# Patient Record
Sex: FEMALE | Race: BLACK OR AFRICAN AMERICAN | NOT HISPANIC OR LATINO | URBAN - METROPOLITAN AREA
[De-identification: names, ages, dates, MRNs, and addresses within clinical notes are randomized per-mention and may not be internally consistent; named-entity substitution may affect disease eponyms.]

---

## 2019-05-15 ENCOUNTER — EMERGENCY (EMERGENCY)
Facility: HOSPITAL | Age: 69
LOS: 1 days | Discharge: HOME | End: 2019-05-15
Attending: EMERGENCY MEDICINE | Admitting: EMERGENCY MEDICINE
Payer: MEDICARE

## 2019-05-15 VITALS
OXYGEN SATURATION: 100 % | TEMPERATURE: 98 F | DIASTOLIC BLOOD PRESSURE: 77 MMHG | HEART RATE: 78 BPM | RESPIRATION RATE: 18 BRPM | SYSTOLIC BLOOD PRESSURE: 156 MMHG

## 2019-05-15 VITALS
RESPIRATION RATE: 18 BRPM | DIASTOLIC BLOOD PRESSURE: 94 MMHG | TEMPERATURE: 99 F | HEART RATE: 88 BPM | SYSTOLIC BLOOD PRESSURE: 165 MMHG | OXYGEN SATURATION: 100 %

## 2019-05-15 PROBLEM — Z00.00 ENCOUNTER FOR PREVENTIVE HEALTH EXAMINATION: Status: ACTIVE | Noted: 2019-05-15

## 2019-05-15 LAB
ALBUMIN SERPL ELPH-MCNC: 4.1 G/DL — SIGNIFICANT CHANGE UP (ref 3.3–5)
ALP SERPL-CCNC: 53 U/L — SIGNIFICANT CHANGE UP (ref 40–120)
ALT FLD-CCNC: 37 U/L — HIGH (ref 4–33)
ANION GAP SERPL CALC-SCNC: 13 MMO/L — SIGNIFICANT CHANGE UP (ref 7–14)
AST SERPL-CCNC: 31 U/L — SIGNIFICANT CHANGE UP (ref 4–32)
BASOPHILS # BLD AUTO: 0.04 K/UL — SIGNIFICANT CHANGE UP (ref 0–0.2)
BASOPHILS NFR BLD AUTO: 0.8 % — SIGNIFICANT CHANGE UP (ref 0–2)
BILIRUB SERPL-MCNC: < 0.2 MG/DL — LOW (ref 0.2–1.2)
BUN SERPL-MCNC: 8 MG/DL — SIGNIFICANT CHANGE UP (ref 7–23)
CALCIUM SERPL-MCNC: 9.3 MG/DL — SIGNIFICANT CHANGE UP (ref 8.4–10.5)
CHLORIDE SERPL-SCNC: 101 MMOL/L — SIGNIFICANT CHANGE UP (ref 98–107)
CO2 SERPL-SCNC: 22 MMOL/L — SIGNIFICANT CHANGE UP (ref 22–31)
CREAT SERPL-MCNC: 0.66 MG/DL — SIGNIFICANT CHANGE UP (ref 0.5–1.3)
EOSINOPHIL # BLD AUTO: 0.08 K/UL — SIGNIFICANT CHANGE UP (ref 0–0.5)
EOSINOPHIL NFR BLD AUTO: 1.6 % — SIGNIFICANT CHANGE UP (ref 0–6)
GLUCOSE SERPL-MCNC: 83 MG/DL — SIGNIFICANT CHANGE UP (ref 70–99)
HCT VFR BLD CALC: 36.3 % — SIGNIFICANT CHANGE UP (ref 34.5–45)
HGB BLD-MCNC: 12 G/DL — SIGNIFICANT CHANGE UP (ref 11.5–15.5)
IMM GRANULOCYTES NFR BLD AUTO: 0.2 % — SIGNIFICANT CHANGE UP (ref 0–1.5)
LYMPHOCYTES # BLD AUTO: 2.47 K/UL — SIGNIFICANT CHANGE UP (ref 1–3.3)
LYMPHOCYTES # BLD AUTO: 48 % — HIGH (ref 13–44)
MCHC RBC-ENTMCNC: 32.9 PG — SIGNIFICANT CHANGE UP (ref 27–34)
MCHC RBC-ENTMCNC: 33.1 % — SIGNIFICANT CHANGE UP (ref 32–36)
MCV RBC AUTO: 99.5 FL — SIGNIFICANT CHANGE UP (ref 80–100)
MONOCYTES # BLD AUTO: 0.56 K/UL — SIGNIFICANT CHANGE UP (ref 0–0.9)
MONOCYTES NFR BLD AUTO: 10.9 % — SIGNIFICANT CHANGE UP (ref 2–14)
NEUTROPHILS # BLD AUTO: 1.99 K/UL — SIGNIFICANT CHANGE UP (ref 1.8–7.4)
NEUTROPHILS NFR BLD AUTO: 38.5 % — LOW (ref 43–77)
NRBC # FLD: 0 K/UL — SIGNIFICANT CHANGE UP (ref 0–0)
PLATELET # BLD AUTO: 314 K/UL — SIGNIFICANT CHANGE UP (ref 150–400)
PMV BLD: 8.7 FL — SIGNIFICANT CHANGE UP (ref 7–13)
POTASSIUM SERPL-MCNC: 4.4 MMOL/L — SIGNIFICANT CHANGE UP (ref 3.5–5.3)
POTASSIUM SERPL-SCNC: 4.4 MMOL/L — SIGNIFICANT CHANGE UP (ref 3.5–5.3)
PROT SERPL-MCNC: 7.1 G/DL — SIGNIFICANT CHANGE UP (ref 6–8.3)
RBC # BLD: 3.65 M/UL — LOW (ref 3.8–5.2)
RBC # FLD: 14.4 % — SIGNIFICANT CHANGE UP (ref 10.3–14.5)
SODIUM SERPL-SCNC: 136 MMOL/L — SIGNIFICANT CHANGE UP (ref 135–145)
WBC # BLD: 5.15 K/UL — SIGNIFICANT CHANGE UP (ref 3.8–10.5)
WBC # FLD AUTO: 5.15 K/UL — SIGNIFICANT CHANGE UP (ref 3.8–10.5)

## 2019-05-15 PROCEDURE — 99284 EMERGENCY DEPT VISIT MOD MDM: CPT

## 2019-05-15 PROCEDURE — 73620 X-RAY EXAM OF FOOT: CPT | Mod: 26,LT

## 2019-05-15 RX ORDER — ACETAMINOPHEN 500 MG
650 TABLET ORAL ONCE
Refills: 0 | Status: COMPLETED | OUTPATIENT
Start: 2019-05-15 | End: 2019-05-15

## 2019-05-15 RX ADMIN — Medication 650 MILLIGRAM(S): at 22:56

## 2019-05-15 RX ADMIN — Medication 100 MILLIGRAM(S): at 22:56

## 2019-05-15 NOTE — ED PROVIDER NOTE - MDM ORDERS SUBMITTED SELECTION
What Is The Reason For Today's Visit?: Full Body Skin Examination
What Is The Reason For Today's Visit? (Being Monitored For X): surveillance against the recurrence of atypical nevi
Labs/Medications/Imaging Studies

## 2019-05-15 NOTE — ED PROVIDER NOTE - NSFOLLOWUPINSTRUCTIONS_ED_ALL_ED_FT
Follow up with Podiatry Dr. Morgan in 1 week. Call [phone] to make an appointment.  Soak foot in Epsom salts, apply bacitracin daily.     Take Clindamycin as prescribed.  Take Tylenol as needed for pain.  Keep elevated.    Return to ER for any new or worsening symptoms, fever, chills, swelling, or any other concerns.

## 2019-05-15 NOTE — ED PROVIDER NOTE - CLINICAL SUMMARY MEDICAL DECISION MAKING FREE TEXT BOX
69 y/o female with pmhx of DM on insulin presents to ED c/o left 1st great toe pain x 2 weeks. found with paronychia and ingrown toe nail. plan- abx, paronychia drainage by podiatry, labs, xray, dispo home

## 2019-05-15 NOTE — ED ADULT TRIAGE NOTE - CHIEF COMPLAINT QUOTE
Pt st" For 2 weeks having problem with left great toe.....saw a MD at [facility] in [place] around 2 hours ago told to go to ER for IV antibiotics."

## 2019-05-15 NOTE — ED PROVIDER NOTE - PROGRESS NOTE DETAILS
Coty: pt seen and evaluated by podiatry, paronychia drained, no need for admission will d.c home with abx pending labs. will dc home, cleared by podiatry

## 2019-05-15 NOTE — ED PROVIDER NOTE - OBJECTIVE STATEMENT
69 y/o female with pmhx of DM on insulin presents to ED c/o left 1st great toe pain x 2 weeks. States started as ingrown, gets the frequently and toe is swollen and tender. Evaluated by [facility] 67 y/o female with pmhx of DM on insulin presents to ED c/o left 1st great toe pain x 2 weeks. States started as ingrown, gets the frequently and toe is swollen and tender. Evaluated by [facility] and sent to ER for concern for bone infection and may need IV abx. No fever, chills, cp, sob, abd pain, n/v, weakness, numbness, tingling, ulcers, streaking, calf pain.

## 2019-05-15 NOTE — ED ADULT NURSE NOTE - CHIEF COMPLAINT QUOTE
Pt st" For 2 weeks having problem with left great toe.....saw a MD at [facility] in [place] around 2 hours ago told to go to ER for IV antibiotics."

## 2019-05-15 NOTE — ED PROVIDER NOTE - ATTENDING CONTRIBUTION TO CARE
Coty: 69yo female with a h/o HTN and DM sent in by [facility] for infected left great toe. Pt endorses 2 weeks of progressively worsening left great toe pain, swelling and erythema. She went to [facility] today and was sent to the ED for further evaluation. NO associated fevers or chills. NKDA. Exam: GENERAL: well appearing, NAD, HEENT: MMM, CARDIO: +S1/S2, no murmurs, rubs or gallops, LUNGS: CTA B/L, no wheezing, rales or rhonchi, ABD: soft, nontender, BSx4 quadrants, no guarding or rigidity. EXT: left great toe paronychia with TTP and erythema, no bony TTP, FROM, NVI distally NEURO: AxOx3, SKIN: toe exam as documented above A/P- 69 yo female with toe paronychia. Low suspicion for osteo or deep space infection. will obtain labs, xray and consult podiatry.

## 2019-05-15 NOTE — ED PROVIDER NOTE - SKIN WOUND TYPE
+left first big toe left sided paronychia and ingrown toe nail. no streaking. no crepitus. no calf tenderness, dp pusle 2+. capillary refill <2 seconds.

## 2019-05-15 NOTE — ED PROVIDER NOTE - SHIFT CHANGE DETAILS
I have signed over this patient to the above attending physician. Pertinent history, physical exam findings and workup thus far in the ED have been discussed. The pending tests and plan, including ;abs, XR and podiatry drainage were signed over.  All questions from the above attending physician have been answered.

## 2019-05-16 NOTE — CONSULT NOTE ADULT - SUBJECTIVE AND OBJECTIVE BOX
Podiatry pager #: [phone] ([place])/ [phone] ([facility])    Patient is a 68y old  Female who presents with a chief complaint of R hallux paronychia.    HPI: 69 y/o female with pmhx of DM on insulin presents to ED c/o left 1st great toe pain x 2 weeks. States started as ingrown, gets the frequently and toe is swollen and tender. Evaluated by [facility] care MD and sent to ER for concern for bone infection and may need IV abx. No fever, chills, cp, sob, abd pain, n/v, weakness, numbness, tingling, ulcers, streaking, calf pain.      PAST MEDICAL & SURGICAL HISTORY:  Hypertension, unspecified type  Diabetes mellitus  No significant past surgical history      MEDICATIONS  (STANDING):    MEDICATIONS  (PRN):      Allergies    No Known Allergies    Intolerances        VITALS:    Vital Signs Last 24 Hrs  T(C): 36.7 (15 May 2019 23:08), Max: 37.4 (15 May 2019 22:02)  T(F): 98 (15 May 2019 23:08), Max: 99.4 (15 May 2019 22:02)  HR: 78 (15 May 2019 23:08) (78 - 88)  BP: 156/77 (15 May 2019 23:08) (156/77 - 165/94)  BP(mean): --  RR: 18 (15 May 2019 23:08) (18 - 18)  SpO2: 100% (15 May 2019 23:08) (100% - 100%)    LABS:                          12.0   5.15  )-----------( 314      ( 15 May 2019 23:11 )             36.3       05-15    136  |  101  |  8   ----------------------------<  83  4.4   |  22  |  0.66    Ca    9.3      15 May 2019 23:11    TPro  7.1  /  Alb  4.1  /  TBili  < 0.2<L>  /  DBili  x   /  AST  31  /  ALT  37<H>  /  AlkPhos  53  05-15      CAPILLARY BLOOD GLUCOSE              LOWER EXTREMITY PHYSICAL EXAM:    Vascular: DP/PT 2/4 B/L, CFT <3 seconds B/L, Temperature gradient warm to cool B/L, +2 pitting edema on dorsum b/l  Neuro: Epicritic sensation intact to the level of toes B/L  Musculoskeletal/Ortho: no gross deformities  Skin: R hallux paronychia at lateral nail border w/ discoloration on dorsal aspect and distal tip of R hallux. Distal tip discoloration is very tender to touch, no fluctuance. No open wounds, no acute signs of infection.    RADIOLOGY & ADDITIONAL STUDIES:  < from: Xray Foot AP + Lateral, Left (05.15.19 @ 23:16) >    ******PRELIMINARY REPORT******    ******PRELIMINARY REPORT******            EXAM:  RAD FOOT 2 VIEWS LEFT        PROCEDURE DATE:  May 15 2019     ******PRELIMINARY REPORT******    ******PRELIMINARY REPORT******            INTERPRETATION:  Soft tissue swelling of the dorsum of the foot.    No subcutaneous gas.    No radiographic evidence of osteomyelitis.            ******PRELIMINARY REPORT******    ******PRELIMINARY REPORT******          KEYA AYALA M.D., RADIOLOGY RESIDENT          < end of copied text >
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
## 2019-09-02 ENCOUNTER — INPATIENT (INPATIENT)
Facility: HOSPITAL | Age: 69
LOS: 3 days | Discharge: PSYCHIATRIC OR SUBSTANCE USE FACILITY | DRG: 918 | End: 2019-09-06
Attending: SURGERY | Admitting: SURGERY
Payer: COMMERCIAL

## 2019-09-02 PROCEDURE — 99291 CRITICAL CARE FIRST HOUR: CPT

## 2019-09-02 PROCEDURE — 72170 X-RAY EXAM OF PELVIS: CPT | Mod: 26

## 2019-09-02 NOTE — ED PROVIDER NOTE - CRITICAL CARE PROVIDED
additional history taking/interpretation of diagnostic studies/direct patient care (not related to procedure)/consultation with other physicians/consult w/ pt's family directly relating to pts condition/documentation

## 2019-09-02 NOTE — ED PROVIDER NOTE - PHYSICAL EXAMINATION
Gen: diaphoretic, GCS 15  Head: NCAT  HEENT: pupils dilated, minimally reactive pupils, MMM, normal conjunctiva, anicteric, neck supple  Lung: CTAB, no adventitious sounds  CV: RRR, no murmurs, rubs or gallops  Abd: soft, NTND, no rebound or guarding, no CVAT  MSK: 2cm laceration to L AC, no active bleeding  Neuro: No focal neurologic deficits. CN II-XII grossly intact. 5/5 strength and normal sensation in all extremities.  Skin: Warm and dry, no evidence of rash  Psych: normal mood and affect
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
## 2019-09-03 VITALS
WEIGHT: 164.69 LBS | RESPIRATION RATE: 19 BRPM | HEART RATE: 74 BPM | DIASTOLIC BLOOD PRESSURE: 69 MMHG | OXYGEN SATURATION: 100 % | SYSTOLIC BLOOD PRESSURE: 133 MMHG | TEMPERATURE: 98 F | HEIGHT: 65 IN

## 2019-09-03 DIAGNOSIS — T39.1X2A POISONING BY 4-AMINOPHENOL DERIVATIVES, INTENTIONAL SELF-HARM, INITIAL ENCOUNTER: ICD-10-CM

## 2019-09-03 DIAGNOSIS — F33.2 MAJOR DEPRESSIVE DISORDER, RECURRENT SEVERE WITHOUT PSYCHOTIC FEATURES: ICD-10-CM

## 2019-09-03 DIAGNOSIS — T14.8XXA OTHER INJURY OF UNSPECIFIED BODY REGION, INITIAL ENCOUNTER: ICD-10-CM

## 2019-09-03 LAB
ALBUMIN SERPL ELPH-MCNC: 3.2 G/DL — LOW (ref 3.3–5)
ALBUMIN SERPL ELPH-MCNC: 3.6 G/DL — SIGNIFICANT CHANGE UP (ref 3.3–5)
ALBUMIN SERPL ELPH-MCNC: 3.9 G/DL — SIGNIFICANT CHANGE UP (ref 3.3–5)
ALP SERPL-CCNC: 36 U/L — LOW (ref 40–120)
ALP SERPL-CCNC: 38 U/L — LOW (ref 40–120)
ALP SERPL-CCNC: 48 U/L — SIGNIFICANT CHANGE UP (ref 40–120)
ALT FLD-CCNC: 19 U/L — SIGNIFICANT CHANGE UP (ref 10–45)
ALT FLD-CCNC: 20 U/L — SIGNIFICANT CHANGE UP (ref 10–45)
ALT FLD-CCNC: 24 U/L — SIGNIFICANT CHANGE UP (ref 10–45)
ANION GAP SERPL CALC-SCNC: 14 MMOL/L — SIGNIFICANT CHANGE UP (ref 5–17)
ANION GAP SERPL CALC-SCNC: 16 MMOL/L — SIGNIFICANT CHANGE UP (ref 5–17)
APAP SERPL-MCNC: 145 UG/ML — HIGH (ref 10–30)
APAP SERPL-MCNC: 31 UG/ML — HIGH (ref 10–30)
APAP SERPL-MCNC: <15 UG/ML — SIGNIFICANT CHANGE UP (ref 10–30)
APTT BLD: 22.1 SEC — LOW (ref 27.5–36.3)
APTT BLD: 32.1 SEC — SIGNIFICANT CHANGE UP (ref 27.5–36.3)
AST SERPL-CCNC: 14 U/L — SIGNIFICANT CHANGE UP (ref 10–40)
AST SERPL-CCNC: 15 U/L — SIGNIFICANT CHANGE UP (ref 10–40)
AST SERPL-CCNC: 23 U/L — SIGNIFICANT CHANGE UP (ref 10–40)
BASOPHILS # BLD AUTO: 0.1 K/UL — SIGNIFICANT CHANGE UP (ref 0–0.2)
BILIRUB DIRECT SERPL-MCNC: <0.1 MG/DL — SIGNIFICANT CHANGE UP (ref 0–0.2)
BILIRUB DIRECT SERPL-MCNC: <0.1 MG/DL — SIGNIFICANT CHANGE UP (ref 0–0.2)
BILIRUB INDIRECT FLD-MCNC: >0.1 MG/DL — LOW (ref 0.2–1)
BILIRUB INDIRECT FLD-MCNC: >0.1 MG/DL — LOW (ref 0.2–1)
BILIRUB SERPL-MCNC: 0.2 MG/DL — SIGNIFICANT CHANGE UP (ref 0.2–1.2)
BLD GP AB SCN SERPL QL: NEGATIVE — SIGNIFICANT CHANGE UP
BUN SERPL-MCNC: 12 MG/DL — SIGNIFICANT CHANGE UP (ref 7–23)
BUN SERPL-MCNC: 13 MG/DL — SIGNIFICANT CHANGE UP (ref 7–23)
BUN SERPL-MCNC: 15 MG/DL — SIGNIFICANT CHANGE UP (ref 7–23)
CALCIUM SERPL-MCNC: 7.9 MG/DL — LOW (ref 8.4–10.5)
CALCIUM SERPL-MCNC: 8.5 MG/DL — SIGNIFICANT CHANGE UP (ref 8.4–10.5)
CALCIUM SERPL-MCNC: 8.6 MG/DL — SIGNIFICANT CHANGE UP (ref 8.4–10.5)
CHLORIDE SERPL-SCNC: 93 MMOL/L — LOW (ref 96–108)
CHLORIDE SERPL-SCNC: 96 MMOL/L — SIGNIFICANT CHANGE UP (ref 96–108)
CHLORIDE SERPL-SCNC: 97 MMOL/L — SIGNIFICANT CHANGE UP (ref 96–108)
CO2 SERPL-SCNC: 11 MMOL/L — LOW (ref 22–31)
CO2 SERPL-SCNC: 18 MMOL/L — LOW (ref 22–31)
CO2 SERPL-SCNC: 21 MMOL/L — LOW (ref 22–31)
CREAT SERPL-MCNC: 0.49 MG/DL — LOW (ref 0.5–1.3)
CREAT SERPL-MCNC: 0.58 MG/DL — SIGNIFICANT CHANGE UP (ref 0.5–1.3)
CREAT SERPL-MCNC: 0.94 MG/DL — SIGNIFICANT CHANGE UP (ref 0.5–1.3)
EOSINOPHIL # BLD AUTO: 0 K/UL — SIGNIFICANT CHANGE UP (ref 0–0.5)
ETHANOL SERPL-MCNC: 63 MG/DL — HIGH (ref 0–10)
GAS PNL BLDA: SIGNIFICANT CHANGE UP
GLUCOSE BLDC GLUCOMTR-MCNC: 148 MG/DL — HIGH (ref 70–99)
GLUCOSE BLDC GLUCOMTR-MCNC: 185 MG/DL — HIGH (ref 70–99)
GLUCOSE BLDC GLUCOMTR-MCNC: 224 MG/DL — HIGH (ref 70–99)
GLUCOSE SERPL-MCNC: 172 MG/DL — HIGH (ref 70–99)
GLUCOSE SERPL-MCNC: 211 MG/DL — HIGH (ref 70–99)
GLUCOSE SERPL-MCNC: 319 MG/DL — HIGH (ref 70–99)
HCT VFR BLD CALC: 30.6 % — LOW (ref 34.5–45)
HCT VFR BLD CALC: 34.7 % — SIGNIFICANT CHANGE UP (ref 34.5–45)
HCV AB S/CO SERPL IA: 0.09 S/CO — SIGNIFICANT CHANGE UP (ref 0–0.99)
HCV AB SERPL-IMP: SIGNIFICANT CHANGE UP
HGB BLD-MCNC: 10.5 G/DL — LOW (ref 11.5–15.5)
HGB BLD-MCNC: 11.6 G/DL — SIGNIFICANT CHANGE UP (ref 11.5–15.5)
INR BLD: 1.03 RATIO — SIGNIFICANT CHANGE UP (ref 0.88–1.16)
INR BLD: 1.1 RATIO — SIGNIFICANT CHANGE UP (ref 0.88–1.16)
LIDOCAIN IGE QN: 81 U/L — HIGH (ref 7–60)
LYMPHOCYTES # BLD AUTO: 4.1 K/UL — HIGH (ref 1–3.3)
LYMPHOCYTES # BLD AUTO: 62 % — HIGH (ref 13–44)
MAGNESIUM SERPL-MCNC: 1.7 MG/DL — SIGNIFICANT CHANGE UP (ref 1.6–2.6)
MAGNESIUM SERPL-MCNC: 1.9 MG/DL — SIGNIFICANT CHANGE UP (ref 1.6–2.6)
MAGNESIUM SERPL-MCNC: 2.2 MG/DL — SIGNIFICANT CHANGE UP (ref 1.6–2.6)
MCHC RBC-ENTMCNC: 33.3 GM/DL — SIGNIFICANT CHANGE UP (ref 32–36)
MCHC RBC-ENTMCNC: 34.1 PG — HIGH (ref 27–34)
MCHC RBC-ENTMCNC: 34.4 GM/DL — SIGNIFICANT CHANGE UP (ref 32–36)
MCHC RBC-ENTMCNC: 34.4 PG — HIGH (ref 27–34)
MCV RBC AUTO: 100 FL — SIGNIFICANT CHANGE UP (ref 80–100)
MCV RBC AUTO: 102 FL — HIGH (ref 80–100)
MONOCYTES # BLD AUTO: 0.4 K/UL — SIGNIFICANT CHANGE UP (ref 0–0.9)
MONOCYTES NFR BLD AUTO: 6 % — SIGNIFICANT CHANGE UP (ref 2–14)
NEUTROPHILS # BLD AUTO: 1.4 K/UL — LOW (ref 1.8–7.4)
NEUTROPHILS NFR BLD AUTO: 19 % — LOW (ref 43–77)
PCP SPEC-MCNC: SIGNIFICANT CHANGE UP
PHOSPHATE SERPL-MCNC: 1.9 MG/DL — LOW (ref 2.5–4.5)
PHOSPHATE SERPL-MCNC: 3.6 MG/DL — SIGNIFICANT CHANGE UP (ref 2.5–4.5)
PLAT MORPH BLD: NORMAL — SIGNIFICANT CHANGE UP
PLATELET # BLD AUTO: 218 K/UL — SIGNIFICANT CHANGE UP (ref 150–400)
PLATELET # BLD AUTO: 276 K/UL — SIGNIFICANT CHANGE UP (ref 150–400)
POTASSIUM SERPL-MCNC: 3.7 MMOL/L — SIGNIFICANT CHANGE UP (ref 3.5–5.3)
POTASSIUM SERPL-MCNC: 3.7 MMOL/L — SIGNIFICANT CHANGE UP (ref 3.5–5.3)
POTASSIUM SERPL-MCNC: 4.2 MMOL/L — SIGNIFICANT CHANGE UP (ref 3.5–5.3)
POTASSIUM SERPL-SCNC: 3.7 MMOL/L — SIGNIFICANT CHANGE UP (ref 3.5–5.3)
POTASSIUM SERPL-SCNC: 3.7 MMOL/L — SIGNIFICANT CHANGE UP (ref 3.5–5.3)
POTASSIUM SERPL-SCNC: 4.2 MMOL/L — SIGNIFICANT CHANGE UP (ref 3.5–5.3)
PROT SERPL-MCNC: 5.6 G/DL — LOW (ref 6–8.3)
PROT SERPL-MCNC: 6 G/DL — SIGNIFICANT CHANGE UP (ref 6–8.3)
PROT SERPL-MCNC: 6.9 G/DL — SIGNIFICANT CHANGE UP (ref 6–8.3)
PROTHROM AB SERPL-ACNC: 11.7 SEC — SIGNIFICANT CHANGE UP (ref 10–12.9)
PROTHROM AB SERPL-ACNC: 12.7 SEC — SIGNIFICANT CHANGE UP (ref 10–12.9)
RBC # BLD: 3.06 M/UL — LOW (ref 3.8–5.2)
RBC # BLD: 3.39 M/UL — LOW (ref 3.8–5.2)
RBC # FLD: 12.5 % — SIGNIFICANT CHANGE UP (ref 10.3–14.5)
RBC # FLD: 12.6 % — SIGNIFICANT CHANGE UP (ref 10.3–14.5)
RBC BLD AUTO: SIGNIFICANT CHANGE UP
RH IG SCN BLD-IMP: NEGATIVE — SIGNIFICANT CHANGE UP
SALICYLATES SERPL-MCNC: <2 MG/DL — LOW (ref 15–30)
SODIUM SERPL-SCNC: 130 MMOL/L — LOW (ref 135–145)
SODIUM SERPL-SCNC: 131 MMOL/L — LOW (ref 135–145)
SODIUM SERPL-SCNC: 131 MMOL/L — LOW (ref 135–145)
TROPONIN T, HIGH SENSITIVITY RESULT: 12 NG/L — SIGNIFICANT CHANGE UP (ref 0–51)
TSH SERPL-MCNC: 3.27 UIU/ML — SIGNIFICANT CHANGE UP (ref 0.27–4.2)
VARIANT LYMPHS # BLD: 13 % — HIGH (ref 0–6)
WBC # BLD: 5.9 K/UL — SIGNIFICANT CHANGE UP (ref 3.8–10.5)
WBC # BLD: 7.4 K/UL — SIGNIFICANT CHANGE UP (ref 3.8–10.5)
WBC # FLD AUTO: 5.9 K/UL — SIGNIFICANT CHANGE UP (ref 3.8–10.5)
WBC # FLD AUTO: 7.4 K/UL — SIGNIFICANT CHANGE UP (ref 3.8–10.5)

## 2019-09-03 PROCEDURE — 72125 CT NECK SPINE W/O DYE: CPT | Mod: 26

## 2019-09-03 PROCEDURE — 71260 CT THORAX DX C+: CPT | Mod: 26

## 2019-09-03 PROCEDURE — 71045 X-RAY EXAM CHEST 1 VIEW: CPT | Mod: 26

## 2019-09-03 PROCEDURE — 70450 CT HEAD/BRAIN W/O DYE: CPT | Mod: 26

## 2019-09-03 PROCEDURE — 99223 1ST HOSP IP/OBS HIGH 75: CPT | Mod: GC

## 2019-09-03 PROCEDURE — 99291 CRITICAL CARE FIRST HOUR: CPT

## 2019-09-03 PROCEDURE — 74177 CT ABD & PELVIS W/CONTRAST: CPT | Mod: 26

## 2019-09-03 RX ORDER — AMLODIPINE BESYLATE 2.5 MG/1
10 TABLET ORAL DAILY
Refills: 0 | Status: DISCONTINUED | OUTPATIENT
Start: 2019-09-03 | End: 2019-09-03

## 2019-09-03 RX ORDER — BRIMONIDINE TARTRATE 2 MG/MG
1 SOLUTION/ DROPS OPHTHALMIC EVERY 12 HOURS
Refills: 0 | Status: DISCONTINUED | OUTPATIENT
Start: 2019-09-03 | End: 2019-09-06

## 2019-09-03 RX ORDER — KETOROLAC TROMETHAMINE 0.5 %
1 DROPS OPHTHALMIC (EYE) DAILY
Refills: 0 | Status: DISCONTINUED | OUTPATIENT
Start: 2019-09-03 | End: 2019-09-06

## 2019-09-03 RX ORDER — HYDRALAZINE HCL 50 MG
10 TABLET ORAL ONCE
Refills: 0 | Status: DISCONTINUED | OUTPATIENT
Start: 2019-09-03 | End: 2019-09-03

## 2019-09-03 RX ORDER — HYDROCHLOROTHIAZIDE 25 MG
25 TABLET ORAL EVERY 24 HOURS
Refills: 0 | Status: DISCONTINUED | OUTPATIENT
Start: 2019-09-03 | End: 2019-09-05

## 2019-09-03 RX ORDER — HYDRALAZINE HCL 50 MG
100 TABLET ORAL
Refills: 0 | Status: DISCONTINUED | OUTPATIENT
Start: 2019-09-03 | End: 2019-09-03

## 2019-09-03 RX ORDER — ZOLPIDEM TARTRATE 10 MG/1
5 TABLET ORAL AT BEDTIME
Refills: 0 | Status: DISCONTINUED | OUTPATIENT
Start: 2019-09-03 | End: 2019-09-03

## 2019-09-03 RX ORDER — CHLORHEXIDINE GLUCONATE 213 G/1000ML
1 SOLUTION TOPICAL
Refills: 0 | Status: DISCONTINUED | OUTPATIENT
Start: 2019-09-03 | End: 2019-09-04

## 2019-09-03 RX ORDER — HYDRALAZINE HCL 50 MG
10 TABLET ORAL ONCE
Refills: 0 | Status: COMPLETED | OUTPATIENT
Start: 2019-09-03 | End: 2019-09-03

## 2019-09-03 RX ORDER — ACETYLCYSTEINE 200 MG/ML
7 VIAL (ML) MISCELLANEOUS ONCE
Refills: 0 | Status: COMPLETED | OUTPATIENT
Start: 2019-09-03 | End: 2019-09-03

## 2019-09-03 RX ORDER — INSULIN LISPRO 100/ML
VIAL (ML) SUBCUTANEOUS
Refills: 0 | Status: DISCONTINUED | OUTPATIENT
Start: 2019-09-03 | End: 2019-09-06

## 2019-09-03 RX ORDER — LISINOPRIL 2.5 MG/1
20 TABLET ORAL DAILY
Refills: 0 | Status: DISCONTINUED | OUTPATIENT
Start: 2019-09-03 | End: 2019-09-03

## 2019-09-03 RX ORDER — TIMOLOL 0.5 %
1 DROPS OPHTHALMIC (EYE)
Refills: 0 | Status: DISCONTINUED | OUTPATIENT
Start: 2019-09-03 | End: 2019-09-03

## 2019-09-03 RX ORDER — SODIUM CHLORIDE 9 MG/ML
1000 INJECTION, SOLUTION INTRAVENOUS
Refills: 0 | Status: DISCONTINUED | OUTPATIENT
Start: 2019-09-03 | End: 2019-09-03

## 2019-09-03 RX ORDER — VALACYCLOVIR 500 MG/1
500 TABLET, FILM COATED ORAL DAILY
Refills: 0 | Status: DISCONTINUED | OUTPATIENT
Start: 2019-09-03 | End: 2019-09-06

## 2019-09-03 RX ORDER — LABETALOL HCL 100 MG
10 TABLET ORAL ONCE
Refills: 0 | Status: COMPLETED | OUTPATIENT
Start: 2019-09-03 | End: 2019-09-03

## 2019-09-03 RX ORDER — INSULIN LISPRO 100/ML
VIAL (ML) SUBCUTANEOUS AT BEDTIME
Refills: 0 | Status: DISCONTINUED | OUTPATIENT
Start: 2019-09-03 | End: 2019-09-06

## 2019-09-03 RX ORDER — LANOLIN ALCOHOL/MO/W.PET/CERES
3 CREAM (GRAM) TOPICAL AT BEDTIME
Refills: 0 | Status: DISCONTINUED | OUTPATIENT
Start: 2019-09-03 | End: 2019-09-06

## 2019-09-03 RX ORDER — HYDRALAZINE HCL 50 MG
25 TABLET ORAL EVERY 8 HOURS
Refills: 0 | Status: DISCONTINUED | OUTPATIENT
Start: 2019-09-03 | End: 2019-09-03

## 2019-09-03 RX ORDER — HYDRALAZINE HCL 50 MG
100 TABLET ORAL
Refills: 0 | Status: DISCONTINUED | OUTPATIENT
Start: 2019-09-03 | End: 2019-09-05

## 2019-09-03 RX ORDER — ACETYLCYSTEINE 200 MG/ML
3.7 VIAL (ML) MISCELLANEOUS ONCE
Refills: 0 | Status: COMPLETED | OUTPATIENT
Start: 2019-09-03 | End: 2019-09-03

## 2019-09-03 RX ORDER — BRIMONIDINE TARTRATE 2 MG/MG
1 SOLUTION/ DROPS OPHTHALMIC
Refills: 0 | Status: DISCONTINUED | OUTPATIENT
Start: 2019-09-03 | End: 2019-09-03

## 2019-09-03 RX ORDER — POTASSIUM CHLORIDE 20 MEQ
40 PACKET (EA) ORAL ONCE
Refills: 0 | Status: COMPLETED | OUTPATIENT
Start: 2019-09-03 | End: 2019-09-03

## 2019-09-03 RX ORDER — TETANUS TOXOID, REDUCED DIPHTHERIA TOXOID AND ACELLULAR PERTUSSIS VACCINE, ADSORBED 5; 2.5; 8; 8; 2.5 [IU]/.5ML; [IU]/.5ML; UG/.5ML; UG/.5ML; UG/.5ML
0.5 SUSPENSION INTRAMUSCULAR ONCE
Refills: 0 | Status: DISCONTINUED | OUTPATIENT
Start: 2019-09-03 | End: 2019-09-06

## 2019-09-03 RX ORDER — SODIUM,POTASSIUM PHOSPHATES 278-250MG
1 POWDER IN PACKET (EA) ORAL
Refills: 0 | Status: DISCONTINUED | OUTPATIENT
Start: 2019-09-03 | End: 2019-09-06

## 2019-09-03 RX ORDER — TIMOLOL 0.5 %
1 DROPS OPHTHALMIC (EYE) EVERY 12 HOURS
Refills: 0 | Status: DISCONTINUED | OUTPATIENT
Start: 2019-09-03 | End: 2019-09-06

## 2019-09-03 RX ORDER — ENOXAPARIN SODIUM 100 MG/ML
40 INJECTION SUBCUTANEOUS EVERY 24 HOURS
Refills: 0 | Status: DISCONTINUED | OUTPATIENT
Start: 2019-09-03 | End: 2019-09-06

## 2019-09-03 RX ORDER — LISINOPRIL 2.5 MG/1
20 TABLET ORAL DAILY
Refills: 0 | Status: DISCONTINUED | OUTPATIENT
Start: 2019-09-03 | End: 2019-09-06

## 2019-09-03 RX ORDER — CALCIUM GLUCONATE 100 MG/ML
2 VIAL (ML) INTRAVENOUS ONCE
Refills: 0 | Status: COMPLETED | OUTPATIENT
Start: 2019-09-03 | End: 2019-09-03

## 2019-09-03 RX ORDER — DOXEPIN HCL 100 MG
50 CAPSULE ORAL
Refills: 0 | Status: DISCONTINUED | OUTPATIENT
Start: 2019-09-03 | End: 2019-09-06

## 2019-09-03 RX ORDER — HYDROCHLOROTHIAZIDE 25 MG
25 TABLET ORAL DAILY
Refills: 0 | Status: DISCONTINUED | OUTPATIENT
Start: 2019-09-03 | End: 2019-09-03

## 2019-09-03 RX ORDER — MAGNESIUM SULFATE 500 MG/ML
2 VIAL (ML) INJECTION ONCE
Refills: 0 | Status: COMPLETED | OUTPATIENT
Start: 2019-09-03 | End: 2019-09-03

## 2019-09-03 RX ORDER — POTASSIUM PHOSPHATE, MONOBASIC POTASSIUM PHOSPHATE, DIBASIC 236; 224 MG/ML; MG/ML
15 INJECTION, SOLUTION INTRAVENOUS ONCE
Refills: 0 | Status: COMPLETED | OUTPATIENT
Start: 2019-09-03 | End: 2019-09-03

## 2019-09-03 RX ORDER — ACETYLCYSTEINE 200 MG/ML
11 VIAL (ML) MISCELLANEOUS ONCE
Refills: 0 | Status: COMPLETED | OUTPATIENT
Start: 2019-09-03 | End: 2019-09-03

## 2019-09-03 RX ADMIN — ENOXAPARIN SODIUM 40 MILLIGRAM(S): 100 INJECTION SUBCUTANEOUS at 12:29

## 2019-09-03 RX ADMIN — LISINOPRIL 20 MILLIGRAM(S): 2.5 TABLET ORAL at 08:40

## 2019-09-03 RX ADMIN — Medication 1 DROP(S): at 17:33

## 2019-09-03 RX ADMIN — Medication 129.63 GRAM(S): at 03:00

## 2019-09-03 RX ADMIN — Medication 50 MILLIGRAM(S): at 22:47

## 2019-09-03 RX ADMIN — Medication 4: at 17:34

## 2019-09-03 RX ADMIN — Medication 10 MILLIGRAM(S): at 15:35

## 2019-09-03 RX ADMIN — SODIUM CHLORIDE 100 MILLILITER(S): 9 INJECTION, SOLUTION INTRAVENOUS at 04:06

## 2019-09-03 RX ADMIN — Medication 1 APPLICATION(S): at 17:20

## 2019-09-03 RX ADMIN — VALACYCLOVIR 500 MILLIGRAM(S): 500 TABLET, FILM COATED ORAL at 17:17

## 2019-09-03 RX ADMIN — Medication 40 MILLIEQUIVALENT(S): at 06:42

## 2019-09-03 RX ADMIN — Medication 3 MILLIGRAM(S): at 22:47

## 2019-09-03 RX ADMIN — LISINOPRIL 20 MILLIGRAM(S): 2.5 TABLET ORAL at 16:34

## 2019-09-03 RX ADMIN — SODIUM CHLORIDE 100 MILLILITER(S): 9 INJECTION, SOLUTION INTRAVENOUS at 08:42

## 2019-09-03 RX ADMIN — Medication 25 MILLIGRAM(S): at 16:34

## 2019-09-03 RX ADMIN — Medication 200 GRAM(S): at 22:33

## 2019-09-03 RX ADMIN — Medication 305 GRAM(S): at 02:00

## 2019-09-03 RX ADMIN — Medication 10 MILLIGRAM(S): at 07:00

## 2019-09-03 RX ADMIN — BRIMONIDINE TARTRATE 1 DROP(S): 2 SOLUTION/ DROPS OPHTHALMIC at 17:19

## 2019-09-03 RX ADMIN — POTASSIUM PHOSPHATE, MONOBASIC POTASSIUM PHOSPHATE, DIBASIC 62.5 MILLIMOLE(S): 236; 224 INJECTION, SOLUTION INTRAVENOUS at 23:24

## 2019-09-03 RX ADMIN — Medication 64.69 GRAM(S): at 08:41

## 2019-09-03 RX ADMIN — Medication 100 MILLIGRAM(S): at 13:09

## 2019-09-03 RX ADMIN — Medication 1 DROP(S): at 17:18

## 2019-09-03 RX ADMIN — Medication 2: at 12:56

## 2019-09-03 RX ADMIN — Medication 50 GRAM(S): at 06:41

## 2019-09-03 NOTE — CONSULT NOTE ADULT - PROBLEM SELECTOR RECOMMENDATION 9
-21 hour protocol of NAC: 200 mg/kg over 1 hour loading dose, then 100 mg/kg over 4 hours, then 150 mg/kg over 16 hours.   -repeat lft's, pH, lactate, phos, coags, APAP level, prior to termination of protocol to determine if further NAC on modified protocol is needed.   -supportive care  -rest of care per primary team   -thank you for the consult -21 hour protocol of NAC: 150 mg/kg over 1 hour loading dose, then 50 mg/kg over 4 hours, then 100 mg/kg over 16 hours.   -repeat lft's, pH, lactate, phos, coags, APAP level, prior to termination of protocol to determine if further NAC on modified protocol is needed.   -supportive care  -rest of care per primary team   -thank you for the consult

## 2019-09-03 NOTE — BEHAVIORAL HEALTH ASSESSMENT NOTE - NSBHCHARTREVIEWIMAGING_PSY_A_CORE FT
CT Head and Cervical Spine:  Impression:     1. No evidence of acute intracranial trauma.  2. No acute displaced cervical spine fracture.  3. Left thyroid nodules. Nonurgent ultrasound is recommended for further   evaluation.    CT Chest & A/P:  IMPRESSION:       1. No evidence of acute trauma within the chest, abdomen and pelvis.  2. Mild CBD dilatation up to 8 mm. Recommend nonurgent MRI for further   evaluation.  3. Mild dilatation of the main pulmonary artery, which can be seen with   pulmonary arterial hypertension.  4. Small bilateral adrenal myelolipomas and small right upper pole renal   angiomyolipoma.
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
## 2019-09-03 NOTE — BEHAVIORAL HEALTH ASSESSMENT NOTE - DESCRIPTION (FIRST USE, LAST USE, QUANTITY, FREQUENCY, DURATION)
Currently still using, two drinks of wine per night last two weeks. Previously drank vodka and rum during marriage
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
## 2019-09-03 NOTE — BEHAVIORAL HEALTH ASSESSMENT NOTE - NSBHCHARTREVIEWVS_PSY_A_CORE FT
ICU Vital Signs Last 24 Hrs  T(C): 36.2 (03 Sep 2019 11:00), Max: 37 (03 Sep 2019 07:00)  T(F): 97.2 (03 Sep 2019 11:00), Max: 98.6 (03 Sep 2019 07:00)  HR: 90 (03 Sep 2019 11:00) (71 - 96)  BP: 180/89 (03 Sep 2019 08:33) (117/68 - 180/89)  BP(mean): 127 (03 Sep 2019 08:33) (88 - 127)  ABP: 143/55 (03 Sep 2019 11:00) (137/55 - 198/84)  ABP(mean): 80 (03 Sep 2019 11:00) (78 - 127)  RR: 13 (03 Sep 2019 11:00) (8 - 27)  SpO2: 100% (03 Sep 2019 11:00) (99% - 100%)

## 2019-09-03 NOTE — H&P ADULT - NSHPLABSRESULTS_GEN_ALL_CORE
CBC Full  -  ( 02 Sep 2019 23:55 )  WBC Count : 5.9 K/uL  RBC Count : 3.39 M/uL  Hemoglobin : 11.6 g/dL  Hematocrit : 34.7 %  Platelet Count - Automated : 276 K/uL  Mean Cell Volume : 102.0 fl  Mean Cell Hemoglobin : 34.1 pg  Mean Cell Hemoglobin Concentration : 33.3 gm/dL  Auto Neutrophil # : x  Auto Lymphocyte # : x  Auto Monocyte # : x  Auto Eosinophil # : x  Auto Basophil # : x  Auto Neutrophil % : x  Auto Lymphocyte % : x  Auto Monocyte % : x  Auto Eosinophil % : x  Auto Basophil % : x            CAPILLARY BLOOD GLUCOSE      POCT Blood Glucose.: 288 mg/dL (02 Sep 2019 23:35)
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
## 2019-09-03 NOTE — BEHAVIORAL HEALTH ASSESSMENT NOTE - DIFFERENTIAL
MDD vs PTSD, most likely decompensated MDD given patient's social situation, being off of her home medication, described symptoms, SA
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
## 2019-09-03 NOTE — BEHAVIORAL HEALTH ASSESSMENT NOTE - NSBHCHARTREVIEWLAB_PSY_A_CORE FT
CBC Full  -  ( 03 Sep 2019 04:20 )  WBC Count : 7.4 K/uL  RBC Count : 3.06 M/uL  Hemoglobin : 10.5 g/dL  Hematocrit : 30.6 %  Platelet Count - Automated : 218 K/uL  Mean Cell Volume : 100.0 fl  Mean Cell Hemoglobin : 34.4 pg  Mean Cell Hemoglobin Concentration : 34.4 gm/dL  Auto Neutrophil # : x  Auto Lymphocyte # : x  Auto Monocyte # : x  Auto Eosinophil # : x  Auto Basophil # : x  Auto Neutrophil % : x  Auto Lymphocyte % : x  Auto Monocyte % : x  Auto Eosinophil % : x  Auto Basophil % : x    09-03    131<L>  |  97  |  13  ----------------------------<  172<H>  3.7   |  18<L>  |  0.49<L>    Ca    7.9<L>      03 Sep 2019 04:20  Phos  3.6     09-03  Mg     1.7     09-03    TPro  5.6<L>  /  Alb  3.2<L>  /  TBili  0.2  /  DBili  <0.1  /  AST  15  /  ALT  19  /  AlkPhos  36<L>  09-03    LIVER FUNCTIONS - ( 03 Sep 2019 04:20 )  Alb: 3.2 g/dL / Pro: 5.6 g/dL / ALK PHOS: 36 U/L / ALT: 19 U/L / AST: 15 U/L / GGT: x

## 2019-09-03 NOTE — BEHAVIORAL HEALTH ASSESSMENT NOTE - NSBHCONSULTMEDS_PSY_A_CORE FT
1. Recommend re-starting patient's home Doxepin 75mg qHS    2. Melatonin 3mg qHS    3. Daily LFT's: Will require psychiatric admission following medical clearance, would like 48-72 hours to see stable liver enzymes if possible 1. Recommend re-starting patient's home Doxepin 50mg qHS    2. Melatonin 3mg qHS    3. Daily LFT's: Will require psychiatric admission following medical clearance, would like 48-72 hours to see stable liver enzymes if possible    4. c/w 1:1 CO

## 2019-09-03 NOTE — BEHAVIORAL HEALTH ASSESSMENT NOTE - NSBHCHARTREVIEWINVESTIGATE_PSY_A_CORE FT
Ventricular Rate 83 BPM    Atrial Rate 83 BPM    P-R Interval 168 ms    QRS Duration 90 ms    Q-T Interval 382 ms    QTC Calculation(Bezet) 448 ms    P Axis 51 degrees    R Axis 45 degrees    T Axis 61 degrees    Diagnosis Line NORMAL SINUS RHYTHM  NORMAL ECG    Confirmed by ATTENDING, ED ([id number]),  SHERIDAN LEVY ([id number]) on 9/3/2019 5:16:56 AM
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
## 2019-09-03 NOTE — CONSULT NOTE ADULT - ASSESSMENT
Given the patients unknown time of ingestion and elevated APAP level to 145, NAC is indicated to prevent hepatoxicity.    Regarding the patients, hypotension, it is possible that she has a coingestion with hydralazine, but this seems to be resolving after fluids. Given the patients unknown time of ingestion and elevated APAP level to 145, NAC is indicated to prevent hepatoxicity.    Regarding the patients, hypotension, it is possible that she has a coingestion with hydralazine, but this seems to be resolving after fluids.      Given the fact the patient does not have a clear time of ingestion, NAC treatment was advised. APAP is metabolized to NAPQI by CYP2e1. NAPQI is a free radical product which then leads to increased cellular ROS and creates protein adducts interfering with normal cellular enzymatic processes. As small amounts of free radicals accumulate, glutathione is reduced to GSSH, detoxifying the free radical. However when large amounts of a free radical product accumulate such as NAPQI during overdose, glutathiones is consumed. Once glutathione is completely consumed, hepatocytes begin to under necrosis/apoptosis.     The antidote NAC prevents this process by indirectly replenishing intracellular glutathione. In APAP overdose patients treated with NAC before 8 hours after ingestion, the prognosis is excellent. Given the patients unknown time of ingestion and elevated APAP level to 145, NAC is indicated to prevent hepatoxicity.    Regarding the patients, hypotension, it is possible that she has a coingestion with hydralazine, but this seems to be resolving after fluids.      Given the fact the patient did not initially have a clear time of ingestion, NAC treatment was advised. APAP is metabolized to NAPQI by CYP2e1. NAPQI is a free radical product which then leads to increased cellular ROS and creates protein adducts interfering with normal cellular enzymatic processes. As small amounts of free radicals accumulate, glutathione is reduced to GSSH, detoxifying the free radical. However when large amounts of a free radical product accumulate such as NAPQI during overdose, glutathiones is consumed. Once glutathione is completely consumed, hepatocytes begin to under necrosis/apoptosis.     The antidote NAC prevents this process by indirectly replenishing intracellular glutathione. In APAP overdose patients treated with NAC before 8 hours after ingestion, the prognosis is excellent.

## 2019-09-04 LAB
ALBUMIN SERPL ELPH-MCNC: 3.2 G/DL — LOW (ref 3.3–5)
ALP SERPL-CCNC: 34 U/L — LOW (ref 40–120)
ALT FLD-CCNC: 16 U/L — SIGNIFICANT CHANGE UP (ref 10–45)
ANION GAP SERPL CALC-SCNC: 12 MMOL/L — SIGNIFICANT CHANGE UP (ref 5–17)
APAP SERPL-MCNC: <15 UG/ML — SIGNIFICANT CHANGE UP (ref 10–30)
AST SERPL-CCNC: 13 U/L — SIGNIFICANT CHANGE UP (ref 10–40)
BILIRUB DIRECT SERPL-MCNC: <0.1 MG/DL — SIGNIFICANT CHANGE UP (ref 0–0.2)
BILIRUB INDIRECT FLD-MCNC: >0.1 MG/DL — LOW (ref 0.2–1)
BILIRUB SERPL-MCNC: 0.2 MG/DL — SIGNIFICANT CHANGE UP (ref 0.2–1.2)
BUN SERPL-MCNC: 8 MG/DL — SIGNIFICANT CHANGE UP (ref 7–23)
CALCIUM SERPL-MCNC: 8.5 MG/DL — SIGNIFICANT CHANGE UP (ref 8.4–10.5)
CHLORIDE SERPL-SCNC: 99 MMOL/L — SIGNIFICANT CHANGE UP (ref 96–108)
CO2 SERPL-SCNC: 21 MMOL/L — LOW (ref 22–31)
CREAT SERPL-MCNC: 0.51 MG/DL — SIGNIFICANT CHANGE UP (ref 0.5–1.3)
GLUCOSE BLDC GLUCOMTR-MCNC: 158 MG/DL — HIGH (ref 70–99)
GLUCOSE BLDC GLUCOMTR-MCNC: 168 MG/DL — HIGH (ref 70–99)
GLUCOSE BLDC GLUCOMTR-MCNC: 176 MG/DL — HIGH (ref 70–99)
GLUCOSE BLDC GLUCOMTR-MCNC: 194 MG/DL — HIGH (ref 70–99)
GLUCOSE SERPL-MCNC: 153 MG/DL — HIGH (ref 70–99)
HBA1C BLD-MCNC: 5.6 % — SIGNIFICANT CHANGE UP (ref 4–5.6)
HCT VFR BLD CALC: 25.8 % — LOW (ref 34.5–45)
HGB BLD-MCNC: 9.1 G/DL — LOW (ref 11.5–15.5)
MAGNESIUM SERPL-MCNC: 1.8 MG/DL — SIGNIFICANT CHANGE UP (ref 1.6–2.6)
MCHC RBC-ENTMCNC: 35 PG — HIGH (ref 27–34)
MCHC RBC-ENTMCNC: 35.4 GM/DL — SIGNIFICANT CHANGE UP (ref 32–36)
MCV RBC AUTO: 98.8 FL — SIGNIFICANT CHANGE UP (ref 80–100)
PHOSPHATE SERPL-MCNC: 3.9 MG/DL — SIGNIFICANT CHANGE UP (ref 2.5–4.5)
PLATELET # BLD AUTO: 158 K/UL — SIGNIFICANT CHANGE UP (ref 150–400)
POTASSIUM SERPL-MCNC: 4 MMOL/L — SIGNIFICANT CHANGE UP (ref 3.5–5.3)
POTASSIUM SERPL-SCNC: 4 MMOL/L — SIGNIFICANT CHANGE UP (ref 3.5–5.3)
PROT SERPL-MCNC: 5.5 G/DL — LOW (ref 6–8.3)
RBC # BLD: 2.61 M/UL — LOW (ref 3.8–5.2)
RBC # FLD: 13 % — SIGNIFICANT CHANGE UP (ref 10.3–14.5)
SODIUM SERPL-SCNC: 132 MMOL/L — LOW (ref 135–145)
WBC # BLD: 7.4 K/UL — SIGNIFICANT CHANGE UP (ref 3.8–10.5)
WBC # FLD AUTO: 7.4 K/UL — SIGNIFICANT CHANGE UP (ref 3.8–10.5)

## 2019-09-04 PROCEDURE — 99232 SBSQ HOSP IP/OBS MODERATE 35: CPT

## 2019-09-04 PROCEDURE — 99232 SBSQ HOSP IP/OBS MODERATE 35: CPT | Mod: GC

## 2019-09-04 RX ORDER — MAGNESIUM SULFATE 500 MG/ML
2 VIAL (ML) INJECTION ONCE
Refills: 0 | Status: COMPLETED | OUTPATIENT
Start: 2019-09-04 | End: 2019-09-04

## 2019-09-04 RX ADMIN — CHLORHEXIDINE GLUCONATE 1 APPLICATION(S): 213 SOLUTION TOPICAL at 06:11

## 2019-09-04 RX ADMIN — Medication 1 TABLET(S): at 11:33

## 2019-09-04 RX ADMIN — Medication 3 MILLIGRAM(S): at 22:32

## 2019-09-04 RX ADMIN — VALACYCLOVIR 500 MILLIGRAM(S): 500 TABLET, FILM COATED ORAL at 11:32

## 2019-09-04 RX ADMIN — Medication 1 APPLICATION(S): at 18:11

## 2019-09-04 RX ADMIN — Medication 1 DROP(S): at 11:32

## 2019-09-04 RX ADMIN — Medication 1 TABLET(S): at 09:54

## 2019-09-04 RX ADMIN — LISINOPRIL 20 MILLIGRAM(S): 2.5 TABLET ORAL at 06:08

## 2019-09-04 RX ADMIN — Medication 1 TABLET(S): at 18:20

## 2019-09-04 RX ADMIN — Medication 1 TABLET(S): at 22:32

## 2019-09-04 RX ADMIN — BRIMONIDINE TARTRATE 1 DROP(S): 2 SOLUTION/ DROPS OPHTHALMIC at 18:11

## 2019-09-04 RX ADMIN — Medication 1 APPLICATION(S): at 06:11

## 2019-09-04 RX ADMIN — Medication 50 MILLIGRAM(S): at 22:32

## 2019-09-04 RX ADMIN — Medication 50 GRAM(S): at 05:15

## 2019-09-04 RX ADMIN — Medication 2: at 11:31

## 2019-09-04 RX ADMIN — Medication 25 MILLIGRAM(S): at 18:10

## 2019-09-04 RX ADMIN — Medication 2: at 06:08

## 2019-09-04 RX ADMIN — Medication 100 MILLIGRAM(S): at 18:11

## 2019-09-04 RX ADMIN — Medication 100 MILLIGRAM(S): at 06:12

## 2019-09-04 RX ADMIN — ENOXAPARIN SODIUM 40 MILLIGRAM(S): 100 INJECTION SUBCUTANEOUS at 11:33

## 2019-09-04 RX ADMIN — Medication 2: at 18:13

## 2019-09-04 RX ADMIN — BRIMONIDINE TARTRATE 1 DROP(S): 2 SOLUTION/ DROPS OPHTHALMIC at 06:08

## 2019-09-04 RX ADMIN — Medication 1 DROP(S): at 06:13

## 2019-09-04 RX ADMIN — Medication 1 DROP(S): at 18:14

## 2019-09-04 NOTE — PROGRESS NOTE BEHAVIORAL HEALTH - NSBHCHARTREVIEWIMAGING_PSY_A_CORE FT
· Available Imaging Results: CT Head and Cervical Spine:  	Impression:     	1. No evidence of acute intracranial trauma.  	2. No acute displaced cervical spine fracture.  	3. Left thyroid nodules. Nonurgent ultrasound is recommended for further   	evaluation.    	CT Chest & A/P:  	IMPRESSION:       	1. No evidence of acute trauma within the chest, abdomen and pelvis.  	2. Mild CBD dilatation up to 8 mm. Recommend nonurgent MRI for further   	evaluation.  	3. Mild dilatation of the main pulmonary artery, which can be seen with   	pulmonary arterial hypertension.  	4. Small bilateral adrenal myelolipomas and small right upper pole renal   angiomyolipoma.
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
## 2019-09-04 NOTE — PROGRESS NOTE ADULT - SUBJECTIVE AND OBJECTIVE BOX
TRAUMA SURGERY DAILY PROGRESS NOTE:    Interval:  Psych meds restarted.    Subjective:  Patient seen and examined this am.     Vital Signs Last 24 Hrs  T(C): 36.7 (04 Sep 2019 07:00), Max: 37 (03 Sep 2019 19:00)  T(F): 98 (04 Sep 2019 07:00), Max: 98.6 (03 Sep 2019 19:00)  HR: 83 (04 Sep 2019 10:00) (73 - 105)  BP: 101/56 (04 Sep 2019 10:00) (91/52 - 161/82)  BP(mean): 74 (04 Sep 2019 10:00) (66 - 113)  RR: 29 (04 Sep 2019 10:00) (14 - 41)  SpO2: 99% (04 Sep 2019 10:00) (97% - 100%)    Exam:  Gen: NAD, resting in bed, alert and responding appropriately  Resp: Airway patent, non-labored respirations  Abd: Soft, ND, NTTP x 4 quadrants, no rebound or guarding. Incisions c/d/i  Ext: No edema, WWP LUE: Dressings cdi BCR  Neuro: AAOx3, no focal deficits    I&O's Detail    03 Sep 2019 07:01  -  04 Sep 2019 07:00  --------------------------------------------------------  IN:    IV PiggyBack: 975 mL    lactated ringers.: 400 mL    Oral Fluid: 1720 mL    Solution: 400 mL  Total IN: 3495 mL    OUT:    Indwelling Catheter - Urethral: 2000 mL    Voided: 2100 mL  Total OUT: 4100 mL    Total NET: -605 mL      04 Sep 2019 07:01  -  04 Sep 2019 11:14  --------------------------------------------------------  IN:    Oral Fluid: 610 mL  Total IN: 610 mL    OUT:  Total OUT: 0 mL    Total NET: 610 mL          Daily     Daily Weight in k.3 (04 Sep 2019 06:00)    MEDICATIONS  (STANDING):  brimonidine 0.2% Ophthalmic Solution 1 Drop(s) Both EYES every 12 hours  chlorhexidine 2% Cloths 1 Application(s) Topical <User Schedule>  diphtheria/tetanus/pertussis (acellular) Vaccine (ADAcel) 0.5 milliLiter(s) IntraMuscular once  doxepin 50 milliGRAM(s) Oral <User Schedule>  enoxaparin Injectable 40 milliGRAM(s) SubCutaneous every 24 hours  hydrALAZINE 100 milliGRAM(s) Oral two times a day  hydrochlorothiazide 25 milliGRAM(s) Oral every 24 hours  insulin lispro (HumaLOG) corrective regimen sliding scale   SubCutaneous at bedtime  insulin lispro (HumaLOG) corrective regimen sliding scale   SubCutaneous three times a day before meals  ketorolac 0.5% Ophthalmic Solution 1 Drop(s) Both EYES daily  lisinopril 20 milliGRAM(s) Oral daily  melatonin 3 milliGRAM(s) Oral at bedtime  potassium acid phosphate/sodium acid phosphate tablet (K-PHOS No. 2) 1 Tablet(s) Oral four times a day with meals  silver sulfADIAZINE 1% Cream 1 Application(s) Topical two times a day  timolol 0.5% Solution 1 Drop(s) Both EYES every 12 hours  valACYclovir 500 milliGRAM(s) Oral daily    MEDICATIONS  (PRN):      LABS:                        9.1    7.4   )-----------( 158      ( 04 Sep 2019 03:43 )             25.8     -    132<L>  |  99  |  8   ----------------------------<  153<H>  4.0   |  21<L>  |  0.51    Ca    8.5      04 Sep 2019 03:43  Phos  3.9     -  Mg     1.8         TPro  5.5<L>  /  Alb  3.2<L>  /  TBili  0.2  /  DBili  <0.1  /  AST  13  /  ALT  16  /  AlkPhos  34<L>      PT/INR - ( 03 Sep 2019 21:29 )   PT: 11.7 sec;   INR: 1.03 ratio         PTT - ( 03 Sep 2019 21:29 )  PTT:32.1 sec      KRISS Ruffin, PGY-1  [facility]  [id number] with any questions
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
## 2019-09-04 NOTE — PROVIDER CONTACT NOTE (OTHER) - ACTION/TREATMENT ORDERED:
No further treatment needed.  Case discussed with inpatient team.  Please page toxicology with a full phone number including area code for any questions at [phone].
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
## 2019-09-04 NOTE — PROGRESS NOTE ADULT - ATTENDING COMMENTS
Patient seen and examined and agree with above.   NAC drip stopped overnight because tylenol level < 15  Hemodynamically stable.   Will follow with toxicology  -Patient to go to acute psych inservice when medically cleared  Essential hypertension controlled with lisinopril and HCTZ   -goal  mmHg  Hyperglycemia- DM type 2- will continue ISS with goal BG < 180
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
## 2019-09-04 NOTE — PHYSICAL THERAPY INITIAL EVALUATION ADULT - ADDITIONAL COMMENTS
as per pt: PTA pt was living in a PH + Stairs approx 5 steps lives on first floor and was independent in all functional mobility and ADL's. no AD for gait. Pt owns a RW and a cane states she uses them when needed. Pt states she lives with [facility]
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
## 2019-09-04 NOTE — CHART NOTE - NSCHARTNOTEFT_GEN_A_CORE
TRAUMA SURGERY NOTE:    Interval:  Pt transferred from SICU to [facility].    Subjective:  Patient seen and examined.    Exam:  Gen: NAD, resting in bed, alert and responding appropriately  Resp: Airway patent, non-labored respirations  Abd: Soft, ND, NTTP x 4 quadrants, no rebound or guarding.   Ext: No edema, WWP LUE: Dressings cdi BCR  Neuro: AAOx3, no focal deficits    Vital Signs Last 24 Hrs  T(C): 36.6 (04 Sep 2019 12:06), Max: 37 (03 Sep 2019 19:00)  T(F): 97.9 (04 Sep 2019 12:06), Max: 98.6 (03 Sep 2019 19:00)  HR: 73 (04 Sep 2019 12:06) (73 - 105)  BP: 104/62 (04 Sep 2019 12:06) (91/52 - 161/82)  BP(mean): 78 (04 Sep 2019 11:00) (66 - 113)  RR: 18 (04 Sep 2019 12:06) (14 - 41)  SpO2: 98% (04 Sep 2019 12:06) (97% - 100%)    I&O's Detail    03 Sep 2019 07:01  -  04 Sep 2019 07:00  --------------------------------------------------------  IN:    IV PiggyBack: 975 mL    lactated ringers.: 400 mL    Oral Fluid: 1720 mL    Solution: 400 mL  Total IN: 3495 mL    OUT:    Indwelling Catheter - Urethral: 2000 mL    Voided: 2100 mL  Total OUT: 4100 mL    Total NET: -605 mL      04 Sep 2019 07:01  -  04 Sep 2019 12:42  --------------------------------------------------------  IN:    Oral Fluid: 970 mL  Total IN: 970 mL    OUT:  Total OUT: 0 mL    Total NET: 970 mL          Daily     Daily Weight in k.3 (04 Sep 2019 06:00)    MEDICATIONS  (STANDING):  brimonidine 0.2% Ophthalmic Solution 1 Drop(s) Both EYES every 12 hours  diphtheria/tetanus/pertussis (acellular) Vaccine (ADAcel) 0.5 milliLiter(s) IntraMuscular once  doxepin 50 milliGRAM(s) Oral <User Schedule>  enoxaparin Injectable 40 milliGRAM(s) SubCutaneous every 24 hours  hydrALAZINE 100 milliGRAM(s) Oral two times a day  hydrochlorothiazide 25 milliGRAM(s) Oral every 24 hours  insulin lispro (HumaLOG) corrective regimen sliding scale   SubCutaneous at bedtime  insulin lispro (HumaLOG) corrective regimen sliding scale   SubCutaneous three times a day before meals  ketorolac 0.5% Ophthalmic Solution 1 Drop(s) Both EYES daily  lisinopril 20 milliGRAM(s) Oral daily  melatonin 3 milliGRAM(s) Oral at bedtime  potassium acid phosphate/sodium acid phosphate tablet (K-PHOS No. 2) 1 Tablet(s) Oral four times a day with meals  silver sulfADIAZINE 1% Cream 1 Application(s) Topical two times a day  timolol 0.5% Solution 1 Drop(s) Both EYES every 12 hours  valACYclovir 500 milliGRAM(s) Oral daily    MEDICATIONS  (PRN):      LABS:                        9.1    7.4   )-----------( 158      ( 04 Sep 2019 03:43 )             25.8     -    132<L>  |  99  |  8   ----------------------------<  153<H>  4.0   |  21<L>  |  0.51    Ca    8.5      04 Sep 2019 03:43  Phos  3.9     -  Mg     1.8         TPro  5.5<L>  /  Alb  3.2<L>  /  TBili  0.2  /  DBili  <0.1  /  AST  13  /  ALT  16  /  AlkPhos  34<L>      PT/INR - ( 03 Sep 2019 21:29 )   PT: 11.7 sec;   INR: 1.03 ratio         PTT - ( 03 Sep 2019 21:29 )  PTT:32.1 sec      Plan:   - Follow LFTs  - Plan to dc to psych per tox plans  - Tertiary exam    KRISS Ruffin, PGY-1  ACS Trauma Team Surgery  [id number] with any questions

## 2019-09-04 NOTE — PROVIDER CONTACT NOTE (OTHER) - RECOMMENDATIONS
Patient does not need another dose of IV n-acetylcysteine and will not develop any hepatotoxicity from ingestion of acetaminophen.  Patient can cleared from any acetaminophen toxicity.

## 2019-09-04 NOTE — PROVIDER CONTACT NOTE (OTHER) - ASSESSMENT
Patient has received 21 hour IV NAC.  Repeat acetaminophen level undetectable and LFTs within normal limits.

## 2019-09-05 PROBLEM — E11.9 TYPE 2 DIABETES MELLITUS WITHOUT COMPLICATIONS: Chronic | Status: ACTIVE | Noted: 2019-05-15

## 2019-09-05 PROBLEM — I10 ESSENTIAL (PRIMARY) HYPERTENSION: Chronic | Status: ACTIVE | Noted: 2019-05-15

## 2019-09-05 LAB
ALBUMIN SERPL ELPH-MCNC: 4.1 G/DL — SIGNIFICANT CHANGE UP (ref 3.3–5)
ALP SERPL-CCNC: 46 U/L — SIGNIFICANT CHANGE UP (ref 40–120)
ALT FLD-CCNC: 32 U/L — SIGNIFICANT CHANGE UP (ref 10–45)
ANION GAP SERPL CALC-SCNC: 12 MMOL/L — SIGNIFICANT CHANGE UP (ref 5–17)
AST SERPL-CCNC: 31 U/L — SIGNIFICANT CHANGE UP (ref 10–40)
BILIRUB DIRECT SERPL-MCNC: <0.1 MG/DL — SIGNIFICANT CHANGE UP (ref 0–0.2)
BILIRUB INDIRECT FLD-MCNC: SIGNIFICANT CHANGE UP MG/DL (ref 0.2–1)
BILIRUB SERPL-MCNC: 0.3 MG/DL — SIGNIFICANT CHANGE UP (ref 0.2–1.2)
BUN SERPL-MCNC: 9 MG/DL — SIGNIFICANT CHANGE UP (ref 7–23)
CALCIUM SERPL-MCNC: 9.5 MG/DL — SIGNIFICANT CHANGE UP (ref 8.4–10.5)
CHLORIDE SERPL-SCNC: 97 MMOL/L — SIGNIFICANT CHANGE UP (ref 96–108)
CO2 SERPL-SCNC: 22 MMOL/L — SIGNIFICANT CHANGE UP (ref 22–31)
CREAT SERPL-MCNC: 0.6 MG/DL — SIGNIFICANT CHANGE UP (ref 0.5–1.3)
GLUCOSE BLDC GLUCOMTR-MCNC: 126 MG/DL — HIGH (ref 70–99)
GLUCOSE BLDC GLUCOMTR-MCNC: 163 MG/DL — HIGH (ref 70–99)
GLUCOSE BLDC GLUCOMTR-MCNC: 177 MG/DL — HIGH (ref 70–99)
GLUCOSE BLDC GLUCOMTR-MCNC: 228 MG/DL — HIGH (ref 70–99)
GLUCOSE SERPL-MCNC: 147 MG/DL — HIGH (ref 70–99)
HCT VFR BLD CALC: 28.9 % — LOW (ref 34.5–45)
HGB BLD-MCNC: 9.7 G/DL — LOW (ref 11.5–15.5)
MAGNESIUM SERPL-MCNC: 2 MG/DL — SIGNIFICANT CHANGE UP (ref 1.6–2.6)
MCHC RBC-ENTMCNC: 33 PG — SIGNIFICANT CHANGE UP (ref 27–34)
MCHC RBC-ENTMCNC: 33.6 GM/DL — SIGNIFICANT CHANGE UP (ref 32–36)
MCV RBC AUTO: 98.3 FL — SIGNIFICANT CHANGE UP (ref 80–100)
PHOSPHATE SERPL-MCNC: 4.6 MG/DL — HIGH (ref 2.5–4.5)
PLATELET # BLD AUTO: 214 K/UL — SIGNIFICANT CHANGE UP (ref 150–400)
POTASSIUM SERPL-MCNC: 4.2 MMOL/L — SIGNIFICANT CHANGE UP (ref 3.5–5.3)
POTASSIUM SERPL-SCNC: 4.2 MMOL/L — SIGNIFICANT CHANGE UP (ref 3.5–5.3)
PROT SERPL-MCNC: 6.9 G/DL — SIGNIFICANT CHANGE UP (ref 6–8.3)
RBC # BLD: 2.94 M/UL — LOW (ref 3.8–5.2)
RBC # FLD: 13.5 % — SIGNIFICANT CHANGE UP (ref 10.3–14.5)
SODIUM SERPL-SCNC: 131 MMOL/L — LOW (ref 135–145)
WBC # BLD: 6.03 K/UL — SIGNIFICANT CHANGE UP (ref 3.8–10.5)
WBC # FLD AUTO: 6.03 K/UL — SIGNIFICANT CHANGE UP (ref 3.8–10.5)

## 2019-09-05 PROCEDURE — 99232 SBSQ HOSP IP/OBS MODERATE 35: CPT

## 2019-09-05 RX ORDER — HYDRALAZINE HCL 50 MG
100 TABLET ORAL DAILY
Refills: 0 | Status: DISCONTINUED | OUTPATIENT
Start: 2019-09-05 | End: 2019-09-06

## 2019-09-05 RX ORDER — SODIUM CHLORIDE 9 MG/ML
1000 INJECTION INTRAMUSCULAR; INTRAVENOUS; SUBCUTANEOUS ONCE
Refills: 0 | Status: COMPLETED | OUTPATIENT
Start: 2019-09-05 | End: 2019-09-05

## 2019-09-05 RX ADMIN — Medication 1 TABLET(S): at 13:13

## 2019-09-05 RX ADMIN — Medication 1 TABLET(S): at 09:34

## 2019-09-05 RX ADMIN — LISINOPRIL 20 MILLIGRAM(S): 2.5 TABLET ORAL at 06:24

## 2019-09-05 RX ADMIN — Medication 1 DROP(S): at 18:16

## 2019-09-05 RX ADMIN — Medication 2: at 13:14

## 2019-09-05 RX ADMIN — ENOXAPARIN SODIUM 40 MILLIGRAM(S): 100 INJECTION SUBCUTANEOUS at 13:14

## 2019-09-05 RX ADMIN — Medication 1 APPLICATION(S): at 18:15

## 2019-09-05 RX ADMIN — Medication 2: at 09:31

## 2019-09-05 RX ADMIN — VALACYCLOVIR 500 MILLIGRAM(S): 500 TABLET, FILM COATED ORAL at 13:14

## 2019-09-05 RX ADMIN — Medication 3 MILLIGRAM(S): at 22:45

## 2019-09-05 RX ADMIN — Medication 100 MILLIGRAM(S): at 06:24

## 2019-09-05 RX ADMIN — BRIMONIDINE TARTRATE 1 DROP(S): 2 SOLUTION/ DROPS OPHTHALMIC at 06:25

## 2019-09-05 RX ADMIN — Medication 1 DROP(S): at 06:50

## 2019-09-05 RX ADMIN — BRIMONIDINE TARTRATE 1 DROP(S): 2 SOLUTION/ DROPS OPHTHALMIC at 18:15

## 2019-09-05 RX ADMIN — Medication 1 TABLET(S): at 22:45

## 2019-09-05 RX ADMIN — SODIUM CHLORIDE 2000 MILLILITER(S): 9 INJECTION INTRAMUSCULAR; INTRAVENOUS; SUBCUTANEOUS at 14:44

## 2019-09-05 RX ADMIN — Medication 1 TABLET(S): at 18:15

## 2019-09-05 RX ADMIN — Medication 1 APPLICATION(S): at 06:25

## 2019-09-05 RX ADMIN — Medication 50 MILLIGRAM(S): at 22:45

## 2019-09-05 NOTE — PROGRESS NOTE ADULT - SUBJECTIVE AND OBJECTIVE BOX
Trauma Surgery Progress Note    SUBJECTIVE: Pt seen and examined at bedside. No acute events overnight. Pain well controlled. Denies chest pain, SOB, N/V/D, fevers or chills.       MEDICATIONS  (STANDING):  brimonidine 0.2% Ophthalmic Solution 1 Drop(s) Both EYES every 12 hours  diphtheria/tetanus/pertussis (acellular) Vaccine (ADAcel) 0.5 milliLiter(s) IntraMuscular once  doxepin 50 milliGRAM(s) Oral <User Schedule>  enoxaparin Injectable 40 milliGRAM(s) SubCutaneous every 24 hours  hydrALAZINE 100 milliGRAM(s) Oral two times a day  hydrochlorothiazide 25 milliGRAM(s) Oral every 24 hours  insulin lispro (HumaLOG) corrective regimen sliding scale   SubCutaneous at bedtime  insulin lispro (HumaLOG) corrective regimen sliding scale   SubCutaneous three times a day before meals  ketorolac 0.5% Ophthalmic Solution 1 Drop(s) Both EYES daily  lisinopril 20 milliGRAM(s) Oral daily  melatonin 3 milliGRAM(s) Oral at bedtime  potassium acid phosphate/sodium acid phosphate tablet (K-PHOS No. 2) 1 Tablet(s) Oral four times a day with meals  silver sulfADIAZINE 1% Cream 1 Application(s) Topical two times a day  timolol 0.5% Solution 1 Drop(s) Both EYES every 12 hours  valACYclovir 500 milliGRAM(s) Oral daily    MEDICATIONS  (PRN):      OBJECTIVE:    Vital Signs Last 24 Hrs  T(C): 36.8 (05 Sep 2019 04:00), Max: 37.1 (04 Sep 2019 18:00)  T(F): 98.3 (05 Sep 2019 04:00), Max: 98.7 (04 Sep 2019 18:00)  HR: 66 (05 Sep 2019 04:00) (66 - 84)  BP: 117/60 (05 Sep 2019 04:00) (96/51 - 117/60)  BP(mean): 78 (04 Sep 2019 11:00) (68 - 78)  RR: 18 (05 Sep 2019 04:00) (18 - 41)  SpO2: 100% (05 Sep 2019 04:00) (98% - 100%)    General Appearance: Resting comfortably in bed, NAD, awake and alert  Neck: Supple  Chest: non-labored breathing, no respiratory distress  CV: Pulse regular presently  Abdomen: Soft, non-tender, non-distended, no guarding or rebound tenderness  Extremities: warm and well perfused. Left arm dressings c/d/i, dressings changed today. ALEK PATEL AROM fingers, wrist, and elbow    I&O's Summary    04 Sep 2019 07:01  -  05 Sep 2019 07:00  --------------------------------------------------------  IN: 1740 mL / OUT: 400 mL / NET: 1340 mL      I&O's Detail    04 Sep 2019 07:01  -  05 Sep 2019 07:00  --------------------------------------------------------  IN:    Oral Fluid: 1740 mL  Total IN: 1740 mL    OUT:    Voided: 400 mL  Total OUT: 400 mL    Total NET: 1340 mL            LABS:                        9.1    7.4   )-----------( 158      ( 04 Sep 2019 03:43 )             25.8     09-04    132<L>  |  99  |  8   ----------------------------<  153<H>  4.0   |  21<L>  |  0.51    Ca    8.5      04 Sep 2019 03:43  Phos  3.9     09-04  Mg     1.8     09-04    TPro  5.5<L>  /  Alb  3.2<L>  /  TBili  0.2  /  DBili  <0.1  /  AST  13  /  ALT  16  /  AlkPhos  34<L>  09-04    PT/INR - ( 03 Sep 2019 21:29 )   PT: 11.7 sec;   INR: 1.03 ratio         PTT - ( 03 Sep 2019 21:29 )  PTT:32.1 sec      RADIOLOGY & ADDITIONAL STUDIES:
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
## 2019-09-06 ENCOUNTER — INPATIENT (INPATIENT)
Facility: HOSPITAL | Age: 69
LOS: 4 days | Discharge: HOME | End: 2019-09-11
Attending: PSYCHIATRY & NEUROLOGY | Admitting: PSYCHIATRY & NEUROLOGY
Payer: MEDICARE

## 2019-09-06 ENCOUNTER — TRANSCRIPTION ENCOUNTER (OUTPATIENT)
Age: 69
End: 2019-09-06

## 2019-09-06 VITALS
HEART RATE: 75 BPM | DIASTOLIC BLOOD PRESSURE: 76 MMHG | TEMPERATURE: 99 F | OXYGEN SATURATION: 99 % | RESPIRATION RATE: 16 BRPM | SYSTOLIC BLOOD PRESSURE: 155 MMHG

## 2019-09-06 VITALS — HEIGHT: 65 IN | RESPIRATION RATE: 18 BRPM | TEMPERATURE: 98 F | WEIGHT: 188.05 LBS

## 2019-09-06 DIAGNOSIS — F33.2 MAJOR DEPRESSIVE DISORDER, RECURRENT SEVERE WITHOUT PSYCHOTIC FEATURES: ICD-10-CM

## 2019-09-06 LAB
ANION GAP SERPL CALC-SCNC: 10 MMOL/L — SIGNIFICANT CHANGE UP (ref 5–17)
BUN SERPL-MCNC: 9 MG/DL — SIGNIFICANT CHANGE UP (ref 7–23)
CALCIUM SERPL-MCNC: 9 MG/DL — SIGNIFICANT CHANGE UP (ref 8.4–10.5)
CHLORIDE SERPL-SCNC: 103 MMOL/L — SIGNIFICANT CHANGE UP (ref 96–108)
CO2 SERPL-SCNC: 23 MMOL/L — SIGNIFICANT CHANGE UP (ref 22–31)
CREAT SERPL-MCNC: 0.55 MG/DL — SIGNIFICANT CHANGE UP (ref 0.5–1.3)
GLUCOSE BLDC GLUCOMTR-MCNC: 167 MG/DL — HIGH (ref 70–99)
GLUCOSE BLDC GLUCOMTR-MCNC: 172 MG/DL — HIGH (ref 70–99)
GLUCOSE SERPL-MCNC: 201 MG/DL — HIGH (ref 70–99)
HCT VFR BLD CALC: 25.9 % — LOW (ref 34.5–45)
HGB BLD-MCNC: 8.6 G/DL — LOW (ref 11.5–15.5)
MCHC RBC-ENTMCNC: 33.2 GM/DL — SIGNIFICANT CHANGE UP (ref 32–36)
MCHC RBC-ENTMCNC: 33.7 PG — SIGNIFICANT CHANGE UP (ref 27–34)
MCV RBC AUTO: 101.6 FL — HIGH (ref 80–100)
PLATELET # BLD AUTO: 223 K/UL — SIGNIFICANT CHANGE UP (ref 150–400)
POTASSIUM SERPL-MCNC: 4.4 MMOL/L — SIGNIFICANT CHANGE UP (ref 3.5–5.3)
POTASSIUM SERPL-SCNC: 4.4 MMOL/L — SIGNIFICANT CHANGE UP (ref 3.5–5.3)
RBC # BLD: 2.55 M/UL — LOW (ref 3.8–5.2)
RBC # FLD: 14.2 % — SIGNIFICANT CHANGE UP (ref 10.3–14.5)
SODIUM SERPL-SCNC: 136 MMOL/L — SIGNIFICANT CHANGE UP (ref 135–145)
WBC # BLD: 5.49 K/UL — SIGNIFICANT CHANGE UP (ref 3.8–10.5)
WBC # FLD AUTO: 5.49 K/UL — SIGNIFICANT CHANGE UP (ref 3.8–10.5)

## 2019-09-06 PROCEDURE — 97162 PT EVAL MOD COMPLEX 30 MIN: CPT

## 2019-09-06 PROCEDURE — 85610 PROTHROMBIN TIME: CPT

## 2019-09-06 PROCEDURE — 86900 BLOOD TYPING SEROLOGIC ABO: CPT

## 2019-09-06 PROCEDURE — 70450 CT HEAD/BRAIN W/O DYE: CPT

## 2019-09-06 PROCEDURE — 80076 HEPATIC FUNCTION PANEL: CPT

## 2019-09-06 PROCEDURE — 86901 BLOOD TYPING SEROLOGIC RH(D): CPT

## 2019-09-06 PROCEDURE — 74177 CT ABD & PELVIS W/CONTRAST: CPT

## 2019-09-06 PROCEDURE — 83735 ASSAY OF MAGNESIUM: CPT

## 2019-09-06 PROCEDURE — 71260 CT THORAX DX C+: CPT

## 2019-09-06 PROCEDURE — 72170 X-RAY EXAM OF PELVIS: CPT

## 2019-09-06 PROCEDURE — 84295 ASSAY OF SERUM SODIUM: CPT

## 2019-09-06 PROCEDURE — 99221 1ST HOSP IP/OBS SF/LOW 40: CPT

## 2019-09-06 PROCEDURE — 36430 TRANSFUSION BLD/BLD COMPNT: CPT

## 2019-09-06 PROCEDURE — 85027 COMPLETE CBC AUTOMATED: CPT

## 2019-09-06 PROCEDURE — 82803 BLOOD GASES ANY COMBINATION: CPT

## 2019-09-06 PROCEDURE — 80048 BASIC METABOLIC PNL TOTAL CA: CPT

## 2019-09-06 PROCEDURE — 82947 ASSAY GLUCOSE BLOOD QUANT: CPT

## 2019-09-06 PROCEDURE — 84100 ASSAY OF PHOSPHORUS: CPT

## 2019-09-06 PROCEDURE — 99291 CRITICAL CARE FIRST HOUR: CPT | Mod: 25

## 2019-09-06 PROCEDURE — 80307 DRUG TEST PRSMV CHEM ANLYZR: CPT

## 2019-09-06 PROCEDURE — G0390: CPT

## 2019-09-06 PROCEDURE — 84484 ASSAY OF TROPONIN QUANT: CPT

## 2019-09-06 PROCEDURE — 84443 ASSAY THYROID STIM HORMONE: CPT

## 2019-09-06 PROCEDURE — 80053 COMPREHEN METABOLIC PANEL: CPT

## 2019-09-06 PROCEDURE — 83036 HEMOGLOBIN GLYCOSYLATED A1C: CPT

## 2019-09-06 PROCEDURE — 84132 ASSAY OF SERUM POTASSIUM: CPT

## 2019-09-06 PROCEDURE — P9016: CPT

## 2019-09-06 PROCEDURE — 71045 X-RAY EXAM CHEST 1 VIEW: CPT

## 2019-09-06 PROCEDURE — 82435 ASSAY OF BLOOD CHLORIDE: CPT

## 2019-09-06 PROCEDURE — 85014 HEMATOCRIT: CPT

## 2019-09-06 PROCEDURE — 85730 THROMBOPLASTIN TIME PARTIAL: CPT

## 2019-09-06 PROCEDURE — 83690 ASSAY OF LIPASE: CPT

## 2019-09-06 PROCEDURE — 86803 HEPATITIS C AB TEST: CPT

## 2019-09-06 PROCEDURE — 83605 ASSAY OF LACTIC ACID: CPT

## 2019-09-06 PROCEDURE — 82962 GLUCOSE BLOOD TEST: CPT

## 2019-09-06 PROCEDURE — 82330 ASSAY OF CALCIUM: CPT

## 2019-09-06 PROCEDURE — 84702 CHORIONIC GONADOTROPIN TEST: CPT

## 2019-09-06 PROCEDURE — 72125 CT NECK SPINE W/O DYE: CPT

## 2019-09-06 RX ORDER — BRIMONIDINE TARTRATE 2 MG/MG
1 SOLUTION/ DROPS OPHTHALMIC DAILY
Refills: 0 | Status: DISCONTINUED | OUTPATIENT
Start: 2019-09-06 | End: 2019-09-06

## 2019-09-06 RX ORDER — DIPHENHYDRAMINE HCL 50 MG
50 CAPSULE ORAL EVERY 6 HOURS
Refills: 0 | Status: DISCONTINUED | OUTPATIENT
Start: 2019-09-06 | End: 2019-09-11

## 2019-09-06 RX ORDER — HALOPERIDOL DECANOATE 100 MG/ML
5 INJECTION INTRAMUSCULAR ONCE
Refills: 0 | Status: DISCONTINUED | OUTPATIENT
Start: 2019-09-06 | End: 2019-09-11

## 2019-09-06 RX ORDER — METFORMIN HYDROCHLORIDE 850 MG/1
1 TABLET ORAL
Qty: 0 | Refills: 0 | DISCHARGE

## 2019-09-06 RX ORDER — AMLODIPINE BESYLATE 2.5 MG/1
1 TABLET ORAL
Qty: 0 | Refills: 0 | DISCHARGE

## 2019-09-06 RX ORDER — TIMOLOL 0.5 %
1 DROPS OPHTHALMIC (EYE) DAILY
Refills: 0 | Status: DISCONTINUED | OUTPATIENT
Start: 2019-09-06 | End: 2019-09-06

## 2019-09-06 RX ORDER — TIMOLOL 0.5 %
1 DROPS OPHTHALMIC (EYE) EVERY 12 HOURS
Refills: 0 | Status: DISCONTINUED | OUTPATIENT
Start: 2019-09-06 | End: 2019-09-11

## 2019-09-06 RX ORDER — HALOPERIDOL DECANOATE 100 MG/ML
5 INJECTION INTRAMUSCULAR EVERY 6 HOURS
Refills: 0 | Status: DISCONTINUED | OUTPATIENT
Start: 2019-09-06 | End: 2019-09-11

## 2019-09-06 RX ORDER — LISINOPRIL 2.5 MG/1
40 TABLET ORAL DAILY
Refills: 0 | Status: DISCONTINUED | OUTPATIENT
Start: 2019-09-06 | End: 2019-09-06

## 2019-09-06 RX ORDER — INSULIN LISPRO 100/ML
VIAL (ML) SUBCUTANEOUS AT BEDTIME
Refills: 0 | Status: DISCONTINUED | OUTPATIENT
Start: 2019-09-06 | End: 2019-09-11

## 2019-09-06 RX ORDER — INSULIN GLARGINE 100 [IU]/ML
20 INJECTION, SOLUTION SUBCUTANEOUS AT BEDTIME
Refills: 0 | Status: DISCONTINUED | OUTPATIENT
Start: 2019-09-06 | End: 2019-09-11

## 2019-09-06 RX ORDER — KETOROLAC TROMETHAMINE 0.5 %
1 DROPS OPHTHALMIC (EYE) DAILY
Refills: 0 | Status: DISCONTINUED | OUTPATIENT
Start: 2019-09-06 | End: 2019-09-11

## 2019-09-06 RX ORDER — LANOLIN ALCOHOL/MO/W.PET/CERES
3 CREAM (GRAM) TOPICAL ONCE
Refills: 0 | Status: COMPLETED | OUTPATIENT
Start: 2019-09-06 | End: 2019-09-06

## 2019-09-06 RX ORDER — ZOLPIDEM TARTRATE 10 MG/1
1 TABLET ORAL
Qty: 0 | Refills: 0 | DISCHARGE

## 2019-09-06 RX ORDER — BRIMONIDINE TARTRATE 2 MG/MG
1 SOLUTION/ DROPS OPHTHALMIC EVERY 12 HOURS
Refills: 0 | Status: DISCONTINUED | OUTPATIENT
Start: 2019-09-06 | End: 2019-09-11

## 2019-09-06 RX ORDER — INSULIN LISPRO 100/ML
VIAL (ML) SUBCUTANEOUS
Refills: 0 | Status: DISCONTINUED | OUTPATIENT
Start: 2019-09-06 | End: 2019-09-11

## 2019-09-06 RX ORDER — LISINOPRIL 2.5 MG/1
20 TABLET ORAL DAILY
Refills: 0 | Status: DISCONTINUED | OUTPATIENT
Start: 2019-09-06 | End: 2019-09-11

## 2019-09-06 RX ORDER — DOXEPIN HCL 100 MG
75 CAPSULE ORAL AT BEDTIME
Refills: 0 | Status: DISCONTINUED | OUTPATIENT
Start: 2019-09-06 | End: 2019-09-08

## 2019-09-06 RX ORDER — VALACYCLOVIR 500 MG/1
500 TABLET, FILM COATED ORAL DAILY
Refills: 0 | Status: DISCONTINUED | OUTPATIENT
Start: 2019-09-06 | End: 2019-09-11

## 2019-09-06 RX ORDER — HYDRALAZINE HCL 50 MG
50 TABLET ORAL
Refills: 0 | Status: DISCONTINUED | OUTPATIENT
Start: 2019-09-06 | End: 2019-09-11

## 2019-09-06 RX ADMIN — BRIMONIDINE TARTRATE 1 DROP(S): 2 SOLUTION/ DROPS OPHTHALMIC at 06:37

## 2019-09-06 RX ADMIN — INSULIN GLARGINE 20 UNIT(S): 100 INJECTION, SOLUTION SUBCUTANEOUS at 23:00

## 2019-09-06 RX ADMIN — Medication 50 MILLIGRAM(S): at 23:00

## 2019-09-06 RX ADMIN — Medication 1 DROP(S): at 06:38

## 2019-09-06 RX ADMIN — LISINOPRIL 20 MILLIGRAM(S): 2.5 TABLET ORAL at 06:37

## 2019-09-06 RX ADMIN — Medication 1 APPLICATION(S): at 06:37

## 2019-09-06 RX ADMIN — ENOXAPARIN SODIUM 40 MILLIGRAM(S): 100 INJECTION SUBCUTANEOUS at 12:55

## 2019-09-06 RX ADMIN — Medication 3 MILLIGRAM(S): at 02:06

## 2019-09-06 RX ADMIN — Medication 75 MILLIGRAM(S): at 23:00

## 2019-09-06 RX ADMIN — Medication 2: at 12:55

## 2019-09-06 RX ADMIN — Medication 100 MILLIGRAM(S): at 06:37

## 2019-09-06 RX ADMIN — Medication 1: at 21:33

## 2019-09-06 RX ADMIN — Medication 1 TABLET(S): at 08:28

## 2019-09-06 RX ADMIN — VALACYCLOVIR 500 MILLIGRAM(S): 500 TABLET, FILM COATED ORAL at 12:56

## 2019-09-06 RX ADMIN — BRIMONIDINE TARTRATE 1 DROP(S): 2 SOLUTION/ DROPS OPHTHALMIC at 23:00

## 2019-09-06 RX ADMIN — Medication 1 TABLET(S): at 12:57

## 2019-09-06 RX ADMIN — Medication 1 DROP(S): at 22:59

## 2019-09-06 RX ADMIN — Medication 2: at 09:10

## 2019-09-06 NOTE — PROGRESS NOTE BEHAVIORAL HEALTH - NSBHCHARTREVIEWLAB_PSY_A_CORE FT
9.7    6.03  )-----------( 214      ( 05 Sep 2019 09:31 )             28.9     09-05    131<L>  |  97  |  9   ----------------------------<  147<H>  4.2   |  22  |  0.60    Ca    9.5      05 Sep 2019 07:33  Phos  4.6     09-05  Mg     2.0     09-05    TPro  6.9  /  Alb  4.1  /  TBili  0.3  /  DBili  <0.1  /  AST  31  /  ALT  32  /  AlkPhos  46  09-05
CBC Full  -  ( 06 Sep 2019 13:34 )  WBC Count : 5.49 K/uL  RBC Count : 2.55 M/uL  Hemoglobin : 8.6 g/dL  Hematocrit : 25.9 %  Platelet Count - Automated : 223 K/uL  Mean Cell Volume : 101.6 fl  Mean Cell Hemoglobin : 33.7 pg  Mean Cell Hemoglobin Concentration : 33.2 gm/dL  Auto Neutrophil # : x  Auto Lymphocyte # : x  Auto Monocyte # : x  Auto Eosinophil # : x  Auto Basophil # : x  Auto Neutrophil % : x  Auto Lymphocyte % : x  Auto Monocyte % : x  Auto Eosinophil % : x  Auto Basophil % : x    09-06    136  |  103  |  9   ----------------------------<  201<H>  4.4   |  23  |  0.55    Ca    9.0      06 Sep 2019 10:27  Phos  4.6     09-05  Mg     2.0     09-05    TPro  6.9  /  Alb  4.1  /  TBili  0.3  /  DBili  <0.1  /  AST  31  /  ALT  32  /  AlkPhos  46  09-05    LIVER FUNCTIONS - ( 05 Sep 2019 07:33 )  Alb: 4.1 g/dL / Pro: 6.9 g/dL / ALK PHOS: 46 U/L / ALT: 32 U/L / AST: 31 U/L / GGT: x
CBC Full  -  ( 04 Sep 2019 03:43 )  WBC Count : 7.4 K/uL  RBC Count : 2.61 M/uL  Hemoglobin : 9.1 g/dL  Hematocrit : 25.8 %  Platelet Count - Automated : 158 K/uL  Mean Cell Volume : 98.8 fl  Mean Cell Hemoglobin : 35.0 pg  Mean Cell Hemoglobin Concentration : 35.4 gm/dL  Auto Neutrophil # : x  Auto Lymphocyte # : x  Auto Monocyte # : x  Auto Eosinophil # : x  Auto Basophil # : x  Auto Neutrophil % : x  Auto Lymphocyte % : x  Auto Monocyte % : x  Auto Eosinophil % : x  Auto Basophil % : x    09-04    132<L>  |  99  |  8   ----------------------------<  153<H>  4.0   |  21<L>  |  0.51    Ca    8.5      04 Sep 2019 03:43  Phos  3.9     09-04  Mg     1.8     09-04    TPro  5.5<L>  /  Alb  3.2<L>  /  TBili  0.2  /  DBili  <0.1  /  AST  13  /  ALT  16  /  AlkPhos  34<L>  09-04    LIVER FUNCTIONS - ( 04 Sep 2019 03:43 )  Alb: 3.2 g/dL / Pro: 5.5 g/dL / ALK PHOS: 34 U/L / ALT: 16 U/L / AST: 13 U/L / GGT: x
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
## 2019-09-06 NOTE — PROGRESS NOTE BEHAVIORAL HEALTH - NSBHCHARTREVIEWINVESTIGATE_PSY_A_CORE FT
< from: 12 Lead ECG (09.02.19 @ 23:47) >    Ventricular Rate 83 BPM    Atrial Rate 83 BPM    P-R Interval 168 ms    QRS Duration 90 ms    Q-T Interval 382 ms    QTC Calculation(Bezet) 448 ms      < end of copied text >
Ventricular Rate 83 BPM    Atrial Rate 83 BPM    P-R Interval 168 ms    QRS Duration 90 ms    Q-T Interval 382 ms    QTC Calculation(Bezet) 448 ms    P Axis 51 degrees    R Axis 45 degrees    T Axis 61 degrees    Diagnosis Line NORMAL SINUS RHYTHM  NORMAL ECG    Confirmed by ATTENDING, ED ([id number]),  SHERIDAN LEVY ([id number]) on 9/3/2019 5:16:56 AM
Ventricular Rate 83 BPM    Atrial Rate 83 BPM    P-R Interval 168 ms    QRS Duration 90 ms    Q-T Interval 382 ms    QTC Calculation(Bezet) 448 ms    P Axis 51 degrees    R Axis 45 degrees    T Axis 61 degrees    Diagnosis Line NORMAL SINUS RHYTHM  NORMAL ECG    Confirmed by ATTENDING, ED ([id number]),  SHERIDAN LEVY ([id number]) on 9/3/2019 5:16:56 AM
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
## 2019-09-06 NOTE — PROGRESS NOTE ADULT - SUBJECTIVE AND OBJECTIVE BOX
Trauma Surgery Progress Note    SUBJECTIVE: Pt seen and examined at bedside. Patient had "weird" abdominal feelings overnight. She is resting comfortably in bed. She is on a 1 to 1 with nursing personal. Denies abdominal pain. Denies chest pain, SOB, N/V/D.       MEDICATIONS  (STANDING):  brimonidine 0.2% Ophthalmic Solution 1 Drop(s) Both EYES every 12 hours  diphtheria/tetanus/pertussis (acellular) Vaccine (ADAcel) 0.5 milliLiter(s) IntraMuscular once  doxepin 50 milliGRAM(s) Oral <User Schedule>  enoxaparin Injectable 40 milliGRAM(s) SubCutaneous every 24 hours  hydrALAZINE 100 milliGRAM(s) Oral daily  insulin lispro (HumaLOG) corrective regimen sliding scale   SubCutaneous at bedtime  insulin lispro (HumaLOG) corrective regimen sliding scale   SubCutaneous three times a day before meals  ketorolac 0.5% Ophthalmic Solution 1 Drop(s) Both EYES daily  lisinopril 20 milliGRAM(s) Oral daily  melatonin 3 milliGRAM(s) Oral at bedtime  potassium acid phosphate/sodium acid phosphate tablet (K-PHOS No. 2) 1 Tablet(s) Oral four times a day with meals  silver sulfADIAZINE 1% Cream 1 Application(s) Topical two times a day  timolol 0.5% Solution 1 Drop(s) Both EYES every 12 hours  valACYclovir 500 milliGRAM(s) Oral daily    MEDICATIONS  (PRN):      OBJECTIVE:    Vital Signs Last 24 Hrs  T(C): 36.8 (06 Sep 2019 06:20), Max: 37.3 (05 Sep 2019 14:21)  T(F): 98.2 (06 Sep 2019 06:20), Max: 99.1 (05 Sep 2019 14:21)  HR: 77 (06 Sep 2019 06:20) (72 - 84)  BP: 114/72 (06 Sep 2019 06:20) (93/56 - 144/81)  BP(mean): --  RR: 18 (06 Sep 2019 06:20) (18 - 18)  SpO2: 97% (06 Sep 2019 06:20) (97% - 100%)    General Appearance: Uncomfortable due to nausea  Neck: Supple  Chest: non-labored breathing, no respiratory distress  CV: Pulse regular presently  Abdomen: Soft, non-tender, non-distended  Extremities: warm and well perfused    I&O's Summary    05 Sep 2019 07:01  -  06 Sep 2019 07:00  --------------------------------------------------------  IN: 2220 mL / OUT: 0 mL / NET: 2220 mL      I&O's Detail    05 Sep 2019 07:01  -  06 Sep 2019 07:00  --------------------------------------------------------  IN:    Oral Fluid: 1220 mL    Sodium Chloride 0.9% IV Bolus: 1000 mL  Total IN: 2220 mL    OUT:  Total OUT: 0 mL    Total NET: 2220 mL            LABS:                        9.7    6.03  )-----------( 214      ( 05 Sep 2019 09:31 )             28.9     09-05    131<L>  |  97  |  9   ----------------------------<  147<H>  4.2   |  22  |  0.60    Ca    9.5      05 Sep 2019 07:33  Phos  4.6     09-05  Mg     2.0     09-05    TPro  6.9  /  Alb  4.1  /  TBili  0.3  /  DBili  <0.1  /  AST  31  /  ALT  32  /  AlkPhos  46  09-05          RADIOLOGY & ADDITIONAL STUDIES: Trauma Surgery Progress Note    SUBJECTIVE: Pt seen and examined at bedside. Patient had "weird" abdominal feelings overnight. She is resting comfortably in bed. She is on a 1 to 1 with nursing personal. Denies abdominal pain. Denies chest pain, SOB, N/V/D.       MEDICATIONS  (STANDING):  brimonidine 0.2% Ophthalmic Solution 1 Drop(s) Both EYES every 12 hours  diphtheria/tetanus/pertussis (acellular) Vaccine (ADAcel) 0.5 milliLiter(s) IntraMuscular once  doxepin 50 milliGRAM(s) Oral <User Schedule>  enoxaparin Injectable 40 milliGRAM(s) SubCutaneous every 24 hours  hydrALAZINE 100 milliGRAM(s) Oral daily  insulin lispro (HumaLOG) corrective regimen sliding scale   SubCutaneous at bedtime  insulin lispro (HumaLOG) corrective regimen sliding scale   SubCutaneous three times a day before meals  ketorolac 0.5% Ophthalmic Solution 1 Drop(s) Both EYES daily  lisinopril 20 milliGRAM(s) Oral daily  melatonin 3 milliGRAM(s) Oral at bedtime  potassium acid phosphate/sodium acid phosphate tablet (K-PHOS No. 2) 1 Tablet(s) Oral four times a day with meals  silver sulfADIAZINE 1% Cream 1 Application(s) Topical two times a day  timolol 0.5% Solution 1 Drop(s) Both EYES every 12 hours  valACYclovir 500 milliGRAM(s) Oral daily    MEDICATIONS  (PRN):      OBJECTIVE:    Vital Signs Last 24 Hrs  T(C): 36.8 (06 Sep 2019 06:20), Max: 37.3 (05 Sep 2019 14:21)  T(F): 98.2 (06 Sep 2019 06:20), Max: 99.1 (05 Sep 2019 14:21)  HR: 77 (06 Sep 2019 06:20) (72 - 84)  BP: 114/72 (06 Sep 2019 06:20) (93/56 - 144/81)  BP(mean): --  RR: 18 (06 Sep 2019 06:20) (18 - 18)  SpO2: 97% (06 Sep 2019 06:20) (97% - 100%)    General Appearance: Resting comfortably in bed, NAD, awake and alert  Neck: Supple  Chest: non-labored breathing, no respiratory distress  CV: Pulse regular presently  Abdomen: Soft, non-tender, non-distended, no guarding or rebound tenderness  Extremities: warm and well perfused. Left arm dressings c/d/i, dressings changed today. ALEK PATEL AROM fingers, wrist, and elbow    I&O's Summary    05 Sep 2019 07:01  -  06 Sep 2019 07:00  --------------------------------------------------------  IN: 2220 mL / OUT: 0 mL / NET: 2220 mL      I&O's Detail    05 Sep 2019 07:01  -  06 Sep 2019 07:00  --------------------------------------------------------  IN:    Oral Fluid: 1220 mL    Sodium Chloride 0.9% IV Bolus: 1000 mL  Total IN: 2220 mL    OUT:  Total OUT: 0 mL    Total NET: 2220 mL            LABS:                        9.7    6.03  )-----------( 214      ( 05 Sep 2019 09:31 )             28.9     09-05    131<L>  |  97  |  9   ----------------------------<  147<H>  4.2   |  22  |  0.60    Ca    9.5      05 Sep 2019 07:33  Phos  4.6     09-05  Mg     2.0     09-05    TPro  6.9  /  Alb  4.1  /  TBili  0.3  /  DBili  <0.1  /  AST  31  /  ALT  32  /  AlkPhos  46  09-05          RADIOLOGY & ADDITIONAL STUDIES:

## 2019-09-06 NOTE — CHART NOTE - NSCHARTNOTEFT_GEN_A_CORE
Paged about pt having "weird" abd feelings. Pt denied pain.    Pt was sleeping comfortably by the time I got to her room.    Dw w 1 to 1 personal and nurse decided to just let her rest will monitor

## 2019-09-06 NOTE — DISCHARGE NOTE PROVIDER - NSDCCPCAREPLAN_GEN_ALL_CORE_FT
PRINCIPAL DISCHARGE DIAGNOSIS  Diagnosis: Acetaminophen overdose, intentional self-harm, initial encounter  Assessment and Plan of Treatment: Treated with NAC until aceaminophen levels decreased and liver enzymes returned to within normal limits. Applied dressings to left AC laceration and left wrist laceration. Discharge to inpatient psych center      SECONDARY DISCHARGE DIAGNOSES  Diagnosis: Hypotension  Assessment and Plan of Treatment: Discontinued HCTZ medication. 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 Discharge to inpatient psych center

## 2019-09-06 NOTE — DISCHARGE NOTE NURSING/CASE MANAGEMENT/SOCIAL WORK - PATIENT PORTAL LINK FT
You can access the FollowMyHealth Patient Portal offered by [facility] by registering at the following website: [email or web address]. By joining [facility]’s FollowMyHealth portal, you will also be able to view your health information using other applications (apps) compatible with our system.

## 2019-09-06 NOTE — PROGRESS NOTE BEHAVIORAL HEALTH - NSBHCONSULTMEDS_PSY_A_CORE FT
1. Continue Doxepin 50mg qHS    2. Melatonin 3mg qHS    3. Daily LFT's: Will require psychiatric admission following medical clearance    4. c/w 1:1 CO
1. Continue Doxepin 50mg qHS    2. Melatonin 3mg qHS    3. Daily LFT's: Will require psychiatric admission following medical clearance    4. c/w 1:1 CO
1. Continue Doxepin 50mg qHS    2. Melatonin 3mg qHS    3. Will require psychiatric admission following medical clearance. currently awaiting bed    4. c/w 1:1 CO

## 2019-09-06 NOTE — PROGRESS NOTE BEHAVIORAL HEALTH - NSBHCHARTREVIEWVS_PSY_A_CORE FT
Vital Signs Last 24 Hrs  T(C): 37.2 (06 Sep 2019 13:36), Max: 37.3 (05 Sep 2019 14:21)  T(F): 98.9 (06 Sep 2019 13:36), Max: 99.1 (05 Sep 2019 14:21)  HR: 80 (06 Sep 2019 13:36) (72 - 84)  BP: 109/60 (06 Sep 2019 13:36) (93/56 - 144/81)  BP(mean): --  RR: 18 (06 Sep 2019 13:36) (18 - 18)  SpO2: 95% (06 Sep 2019 13:36) (95% - 100%)
Vital Signs Last 24 Hrs  T(C): 37.3 (05 Sep 2019 14:21), Max: 37.3 (05 Sep 2019 14:21)  T(F): 99.1 (05 Sep 2019 14:21), Max: 99.1 (05 Sep 2019 14:21)  HR: 84 (05 Sep 2019 14:21) (66 - 84)  BP: 93/56 (05 Sep 2019 14:21) (93/56 - 117/60)  BP(mean): --  RR: 18 (05 Sep 2019 14:21) (18 - 18)  SpO2: 100% (05 Sep 2019 14:21) (98% - 100%)  T(C): 37.3 (09-05-19 @ 14:21), Max: 37.3 (09-05-19 @ 14:21)  HR: 84 (09-05-19 @ 14:21) (66 - 84)  BP: 93/56 (09-05-19 @ 14:21) (93/56 - 117/60)  RR: 18 (09-05-19 @ 14:21) (18 - 18)  SpO2: 100% (09-05-19 @ 14:21) (98% - 100%)  Wt(kg): --
ICU Vital Signs Last 24 Hrs  T(C): 36.6 (04 Sep 2019 12:06), Max: 37 (03 Sep 2019 19:00)  T(F): 97.9 (04 Sep 2019 12:06), Max: 98.6 (03 Sep 2019 19:00)  HR: 73 (04 Sep 2019 12:06) (73 - 105)  BP: 104/62 (04 Sep 2019 12:06) (91/52 - 161/82)  BP(mean): 78 (04 Sep 2019 11:00) (66 - 113)  ABP: 149/69 (04 Sep 2019 05:00) (104/47 - 207/70)  ABP(mean): 95 (04 Sep 2019 05:00) (66 - 106)  RR: 18 (04 Sep 2019 12:06) (14 - 41)  SpO2: 98% (04 Sep 2019 12:06) (97% - 100%)

## 2019-09-06 NOTE — PROGRESS NOTE BEHAVIORAL HEALTH - THOUGHT PROCESS
Overinclusive/Circumstantial/Linear
Overinclusive/Circumstantial/Linear
Linear/Overinclusive/Circumstantial
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
## 2019-09-06 NOTE — PROGRESS NOTE BEHAVIORAL HEALTH - PRIMARY DX
MDD (major depressive disorder), recurrent severe, without psychosis

## 2019-09-06 NOTE — DISCHARGE NOTE PROVIDER - PROVIDER TOKENS
FREE:[LAST:[Milagro],PHONE:[phone]],FAX:[phone]    -],ADDRESS:[[facility]    [address] [place] [address]]]
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
## 2019-09-06 NOTE — PROGRESS NOTE BEHAVIORAL HEALTH - NSBHFUPINTERVALHXFT_PSY_A_CORE
On interview this AM patient sitting up in chair, watching TV and eating breakfast. Greets interviewer appropriately, states that being able to sleep last night helped her think more clearly this AM. Denies current SIIP/HIIP/AVH. Explains that she was extremely overwhelmed over the last three weeks, was not sleeping, had no energy. Overall, during the interview patient is difficult to interrupt, though not pressured, and is in significantly better spirits than yesterday. Circumstantial, over-inclusive. Continues to spend a significant part of the interview talking about her children and their life journeys. Pleasant and cooperates throughout interview.     Says that she will attempt to look for a new place to live. She is open to inpatient psychiatric admission following discharge, understands the severity of her depressed mood and SA--and that following d/c at this time patient would still be returning home. Identifies reasons to live, though again is very circumstantial. Future-oriented, no firearms.
Patient seen and evaluated, staff consulted, chart, labs, meds reviewed. Reports no issues overnight. Patient states there are mild improvements in mood, energy, appetite, and sleep. No manifest SI/HI, no hopelessness, helplessness, guilt, or other major mood sx. noted or reported. Patient also mentions improvement in sleep with re-introduction of doxepin. Severity of patient's attempt at this time indicates a need for continued inpatient care.    Direction of care discussed with the patient. Counseling and support provided.  Patient did not represent a management problem overnight.
On interview today patient reports that she feels tired this AM, had trouble sleeping last night and required an extra dose of Melatonin. Explains that last night she had vague left sided abdominal/chest discomfort that has now subsided. Denies SIIP/HIIP/AVH. States she has moved past her SA, "does not want to think about those things moving forward". Identifies that in order to improve her situation she will need to make some life changes, and lists several things she plans to change. States she has received good family and friend support thus far as people have been visiting her in the hospital. Overall, patient in improved mood from admission, with some insight into her depression and what led to her SA. However patient remains elevated risk and requires inpatient psychiatry admission given severe attempt, hopelessness on admission, similar social situation at this time.

## 2019-09-06 NOTE — PROGRESS NOTE BEHAVIORAL HEALTH - SUMMARY
This is a 69-y.o. AAF pt, , domiciled with daughter and daughter's family, retired, PPH of depressive disorder, currently in outpatient treatment (psychiatrist Dr. Tinajero, seen last month), no prior psychiatric hospitalizations, prior SIB, no hx of violence or legal issues, hx of alcohol abuse, PMH of htn, hld, dm, brought to the ED after SA with Tylenol OD and self-inflicted wounds on wrist and L antecubital fossa, psychiatry consulted for Suicidal attempt.    Patient presents with considerable mood sx. requiring further inpatient treatment. Legals (2PC) completed.
Ms. Edmond is a 70 y/o woman, , domiciled with daughter and daughter's family, retired, PPH of depressive disorder, currently in outpatient treatment (psychiatrist Dr. Tinajero, seen last month), no prior psychiatric hospitalizations, prior SIB, no hx of violence or legal issues, hx of alcohol abuse, PMH of htn, hld, dm, brought to the ED after SA with Tylenol OD and self-inflicted wounds on wrist and L antecubital fossa, psychiatry consulted for Suicidal attempt.     On interview this AM patient reports significantly improved mood, understands her likely need for inpt psychiatry following medical clearance.
This is a 69-y.o. AAF pt, , domiciled with daughter and daughter's family, retired, PPH of depressive disorder, currently in outpatient treatment (psychiatrist Dr. Tinajero, seen last month), no prior psychiatric hospitalizations, prior SIB, no hx of violence or legal issues, hx of alcohol abuse, PMH of htn, hld, dm, brought to the ED after SA with Tylenol OD and self-inflicted wounds on wrist and L antecubital fossa, psychiatry consulted for Suicidal attempt.    Patient presents with considerable mood sx. requiring further inpatient treatment. Legals (2PC) completed.

## 2019-09-06 NOTE — DISCHARGE NOTE PROVIDER - CARE PROVIDER_API CALL
Milagro   [facility]    [address] [place] [address]  Phone: [phone]  Fax: (   )    -  Follow Up Time:

## 2019-09-06 NOTE — DISCHARGE NOTE PROVIDER - NSFOLLOWUPCLINICS_GEN_ALL_ED_FT
[facility]  General Surgery  [address] [place] [address] - [address]rd [place] [address]  Phone: [phone]  Fax:   Follow Up Time:

## 2019-09-07 LAB
CHOLEST SERPL-MCNC: 175 MG/DL — SIGNIFICANT CHANGE UP (ref 120–199)
HBA1C BLD-MCNC: 5.5 % — SIGNIFICANT CHANGE UP (ref 4–5.6)
HDLC SERPL-MCNC: 82 MG/DL — HIGH (ref 45–65)
LIPID PNL WITH DIRECT LDL SERPL: 85 MG/DL — SIGNIFICANT CHANGE UP
TRIGL SERPL-MCNC: 84 MG/DL — SIGNIFICANT CHANGE UP (ref 10–149)

## 2019-09-07 PROCEDURE — 99222 1ST HOSP IP/OBS MODERATE 55: CPT

## 2019-09-07 PROCEDURE — 99223 1ST HOSP IP/OBS HIGH 75: CPT

## 2019-09-07 RX ORDER — DOCUSATE SODIUM 100 MG
100 CAPSULE ORAL THREE TIMES A DAY
Refills: 0 | Status: DISCONTINUED | OUTPATIENT
Start: 2019-09-07 | End: 2019-09-11

## 2019-09-07 RX ORDER — POLYETHYLENE GLYCOL 3350 17 G/17G
17 POWDER, FOR SOLUTION ORAL DAILY
Refills: 0 | Status: DISCONTINUED | OUTPATIENT
Start: 2019-09-07 | End: 2019-09-11

## 2019-09-07 RX ORDER — METFORMIN HYDROCHLORIDE 850 MG/1
500 TABLET ORAL
Refills: 0 | Status: DISCONTINUED | OUTPATIENT
Start: 2019-09-07 | End: 2019-09-11

## 2019-09-07 RX ORDER — SENNA PLUS 8.6 MG/1
2 TABLET ORAL AT BEDTIME
Refills: 0 | Status: DISCONTINUED | OUTPATIENT
Start: 2019-09-07 | End: 2019-09-11

## 2019-09-07 RX ADMIN — Medication 100 MILLIGRAM(S): at 21:26

## 2019-09-07 RX ADMIN — SENNA PLUS 2 TABLET(S): 8.6 TABLET ORAL at 21:28

## 2019-09-07 RX ADMIN — VALACYCLOVIR 500 MILLIGRAM(S): 500 TABLET, FILM COATED ORAL at 09:48

## 2019-09-07 RX ADMIN — LISINOPRIL 20 MILLIGRAM(S): 2.5 TABLET ORAL at 09:48

## 2019-09-07 RX ADMIN — Medication 50 MILLIGRAM(S): at 21:27

## 2019-09-07 RX ADMIN — Medication 1 DROP(S): at 21:28

## 2019-09-07 RX ADMIN — BRIMONIDINE TARTRATE 1 DROP(S): 2 SOLUTION/ DROPS OPHTHALMIC at 09:47

## 2019-09-07 RX ADMIN — BRIMONIDINE TARTRATE 1 DROP(S): 2 SOLUTION/ DROPS OPHTHALMIC at 21:25

## 2019-09-07 RX ADMIN — Medication 100 MILLIGRAM(S): at 12:24

## 2019-09-07 RX ADMIN — Medication 1 DROP(S): at 09:48

## 2019-09-07 RX ADMIN — INSULIN GLARGINE 20 UNIT(S): 100 INJECTION, SOLUTION SUBCUTANEOUS at 21:27

## 2019-09-07 RX ADMIN — METFORMIN HYDROCHLORIDE 500 MILLIGRAM(S): 850 TABLET ORAL at 21:28

## 2019-09-07 RX ADMIN — Medication 75 MILLIGRAM(S): at 21:26

## 2019-09-07 RX ADMIN — Medication 50 MILLIGRAM(S): at 09:48

## 2019-09-07 NOTE — CONSULT NOTE ADULT - SUBJECTIVE AND OBJECTIVE BOX
HPI: 68 yo F w/ DM2 on insulin, HTN, Anxiety/Depression, who initially presented to [facility] following a suicide attempt via self inflicted wound to the L arm and tylenol Overdose, course c/b bleeding and hypotension, requiring fluid resuscitation (held off on further pRBC transfusions after learning patient was [employer]) and SICU monitoring. Evaluated by toxicology, received NAC for tylenol OD (initial level 145). Subsequently stabilized and transferred to the floors, now admitted to [facility] for further psychiatric management.     Currently...     PAST MEDICAL & SURGICAL HISTORY:  Anxiety and depression  Hypertension, unspecified type  Diabetes mellitus  No significant past surgical history    Review of Systems:   CONSTITUTIONAL:   EYES:   ENMT:    NECK:   RESPIRATORY:   CARDIOVASCULAR:   GASTROINTESTINAL:   GENITOURINARY:   NEUROLOGICAL:   SKIN:    LYMPH NODES:   ENDOCRINE:   MUSCULOSKELETAL:   HEME/LYMPH:   ALLERGY AND IMMUNOLOGIC:     Allergies  No Known Allergies    Intolerances    Social History:   , lives with daughter and daughter's family  No smoking or illicit drug use, +etoh use, recently with daily wine consumption ~2 glasses    FAMILY HISTORY:  No pertinent family history    MEDICATIONS  (STANDING):  brimonidine 0.2% Ophthalmic Solution 1 Drop(s) Both EYES every 12 hours  doxepin 75 milliGRAM(s) Oral at bedtime  hydrALAZINE 50 milliGRAM(s) Oral two times a day  insulin glargine Injectable (LANTUS) 20 Unit(s) SubCutaneous at bedtime  insulin lispro (HumaLOG) corrective regimen sliding scale   SubCutaneous three times a day before meals  insulin lispro (HumaLOG) corrective regimen sliding scale   SubCutaneous at bedtime  ketorolac 0.5% Ophthalmic Solution 1 Drop(s) Both EYES daily  lisinopril 20 milliGRAM(s) Oral daily  timolol 0.5% Solution 1 Drop(s) Both EYES every 12 hours  valACYclovir 500 milliGRAM(s) Oral daily    MEDICATIONS  (PRN):  diphenhydrAMINE 50 milliGRAM(s) Oral every 6 hours PRN EPS ppx/agitation  diphenhydrAMINE   Injectable 50 milliGRAM(s) IntraMuscular every 6 hours PRN EPS ppx/severe agitation  haloperidol     Tablet 5 milliGRAM(s) Oral every 6 hours PRN agitation  haloperidol    Injectable 5 milliGRAM(s) IntraMuscular once PRN severe agitation  LORazepam     Tablet 2 milliGRAM(s) Oral every 6 hours PRN anxiety  LORazepam   Injectable 2 milliGRAM(s) IntraMuscular once PRN severe agitation      Vital Signs Last 24 Hrs  T(C): 37.1 (07 Sep 2019 08:27), Max: 37.2 (06 Sep 2019 13:36)  T(F): 98.8 (07 Sep 2019 08:27), Max: 98.9 (06 Sep 2019 13:36)  HR: 87 (07 Sep 2019 08:27) (75 - 87)  BP: 145/87 (07 Sep 2019 08:27) (109/60 - 155/76)  BP(mean): --  RR: 18 (06 Sep 2019 18:59) (16 - 18)  SpO2: 99% (06 Sep 2019 16:57) (95% - 99%)    CAPILLARY BLOOD GLUCOSE  POCT Blood Glucose.: 167 mg/dL (06 Sep 2019 12:19)    PHYSICAL EXAM:  GENERAL:   HEAD:    EYES:   NECK:  CHEST/LUNG:   HEART:   ABDOMEN:   EXTREMITIES:  PSYCH:   NEUROLOGY:   SKIN:     LABS:                        8.6    5.49  )-----------( 223      ( 06 Sep 2019 13:34 )             25.9     09-06    136  |  103  |  9   ----------------------------<  201<H>  4.4   |  23  |  0.55    Ca    9.0      06 Sep 2019 10:27    EKG(personally reviewed): 9/2 NSR 83 bpm    RADIOLOGY & ADDITIONAL TESTS:    Imaging Personally Reviewed: CT no e/o trauma, incidentally noted thyroid nodule, b/l adrenal myelolipomas and small right upper pole renal angiomyolipoma, mild CBD dilatation    < from: CT Head No Cont (09.03.19 @ 00:27) >  Impression:     1. No evidence of acute intracranial trauma.  2. No acute displaced cervical spine fracture.  3. Left thyroid nodules. Nonurgent ultrasound is recommended for further   evaluation.    < end of copied text >    < from: CT Abdomen and Pelvis w/ IV Cont (09.03.19 @ 00:27) >    IMPRESSION:     1. No evidence of acute trauma within the chest, abdomen and pelvis.  2. Mild CBD dilatation up to 8 mm. Recommend nonurgent MRI for further   evaluation.  3. Mild dilatation of the main pulmonary artery, which can be seen with   pulmonary arterial hypertension.  4. Small bilateral adrenal myelolipomas and small right upper pole renal   angiomyolipoma.    < end of copied text >      Consultant(s) Notes Reviewed:  Records from [facility]    Care Discussed with Consultants/Other Providers: HPI: 68 yo F w/ DM2 on insulin, HTN, Anxiety/Depression, who initially presented to [facility] following a suicide attempt via self inflicted wound to the L arm and tylenol Overdose, course c/b bleeding and hypotension, requiring fluid resuscitation (held off on further pRBC transfusions after learning patient was [employer]) and SICU monitoring. Evaluated by toxicology, received NAC for tylenol OD (initial level 145). Subsequently stabilized and transferred to the floors, now admitted to [facility] for further psychiatric management.     Currently patient is in better spirits, reports plan to turn to her "texts" for support (patient states she is a [employer]) and is looking forward to getting better. Denies fever/chills/pain at arm. Only request at this time is to get something to help her "go" to the bathroom, reports hard stools and having to strain (chronically), and notes no BMs in several days.     In terms of her diabetes, patient reports taking insulin, metformin and glipizide at home. Does note that she has occasional episodes of hypoglycemia and that she is aware of them (sometimes even waking her up from sleep).     She states her blood pressure is under good control, but when it is elevated she sometimes feels "pressure" rising up the back of her head.     PAST MEDICAL & SURGICAL HISTORY:  Anxiety and depression  Hypertension, unspecified type  Diabetes mellitus  S/p Cataract surgery    Review of Systems:   CONSTITUTIONAL: no fever/chills, able to eat  EYES: hx glaucoma and cataract sx, on eye drops  ENMT:  no trouble swallowing  NECK: no neck pain  RESPIRATORY: no SOB  CARDIOVASCULAR: no CP  GASTROINTESTINAL: no N/V/abdominal pain, +constipation (reports no BM in several days)  GENITOURINARY: no dysuria  NEUROLOGICAL: no current headache or focal weakness  SKIN:  wound to L arm without pain/drainage or erythema  ENDOCRINE: +diabetes on insulin, reports hypoglycemia aware with episodes in the past  MUSCULOSKELETAL: no joint pain at this time, some LE edema, trying leg elevation  ALLERGY AND IMMUNOLOGIC: NKDA    Allergies  No Known Allergies    Intolerances    Social History:   , lives with daughter and daughter's family  No smoking or illicit drug use, +etoh use, recently with daily wine consumption ~2 glasses    FAMILY HISTORY:  No pertinent family history    MEDICATIONS  (STANDING):  brimonidine 0.2% Ophthalmic Solution 1 Drop(s) Both EYES every 12 hours  doxepin 75 milliGRAM(s) Oral at bedtime  hydrALAZINE 50 milliGRAM(s) Oral two times a day  insulin glargine Injectable (LANTUS) 20 Unit(s) SubCutaneous at bedtime  insulin lispro (HumaLOG) corrective regimen sliding scale   SubCutaneous three times a day before meals  insulin lispro (HumaLOG) corrective regimen sliding scale   SubCutaneous at bedtime  ketorolac 0.5% Ophthalmic Solution 1 Drop(s) Both EYES daily  lisinopril 20 milliGRAM(s) Oral daily  timolol 0.5% Solution 1 Drop(s) Both EYES every 12 hours  valACYclovir 500 milliGRAM(s) Oral daily    MEDICATIONS  (PRN):  diphenhydrAMINE 50 milliGRAM(s) Oral every 6 hours PRN EPS ppx/agitation  diphenhydrAMINE   Injectable 50 milliGRAM(s) IntraMuscular every 6 hours PRN EPS ppx/severe agitation  haloperidol     Tablet 5 milliGRAM(s) Oral every 6 hours PRN agitation  haloperidol    Injectable 5 milliGRAM(s) IntraMuscular once PRN severe agitation  LORazepam     Tablet 2 milliGRAM(s) Oral every 6 hours PRN anxiety  LORazepam   Injectable 2 milliGRAM(s) IntraMuscular once PRN severe agitation      Vital Signs Last 24 Hrs  T(C): 37.1 (07 Sep 2019 08:27), Max: 37.2 (06 Sep 2019 13:36)  T(F): 98.8 (07 Sep 2019 08:27), Max: 98.9 (06 Sep 2019 13:36)  HR: 87 (07 Sep 2019 08:27) (75 - 87)  BP: 145/87 (07 Sep 2019 08:27) (109/60 - 155/76)  BP(mean): --  RR: 18 (06 Sep 2019 18:59) (16 - 18)  SpO2: 99% (06 Sep 2019 16:57) (95% - 99%)    CAPILLARY BLOOD GLUCOSE  POCT Blood Glucose.: 167 mg/dL (06 Sep 2019 12:19)    PHYSICAL EXAM:  GENERAL: NAD, sitting in room, just finished speaking with staff, in good spirits  HEAD:  NC/AT  EYES: EOMI, s/p cataract surgery   ENMT: MMM  NECK: supple  CHEST/LUNG: CTAB, comfortable on RA   HEART: S1S2 RRR  ABDOMEN: soft, non-tender  EXTREMITIES: no c/c, 1+edema of b/l lower extremities  PSYCH: calm, conversant, smiling  NEUROLOGY: non-focal, moving all extremities  SKIN: LUE gauze removed, AC fossa wound present, no drainage, no erythema (no current e/o infection)    LABS:                        8.6    5.49  )-----------( 223      ( 06 Sep 2019 13:34 )             25.9     09-06    136  |  103  |  9   ----------------------------<  201<H>  4.4   |  23  |  0.55    Ca    9.0      06 Sep 2019 10:27    EKG(personally reviewed): 9/2 NSR 83 bpm    RADIOLOGY & ADDITIONAL TESTS:    Imaging Personally Reviewed: CT no e/o trauma, incidentally noted thyroid nodule, b/l adrenal myelolipomas and small right upper pole renal angiomyolipoma, mild CBD dilatation    < from: CT Head No Cont (09.03.19 @ 00:27) >  Impression:     1. No evidence of acute intracranial trauma.  2. No acute displaced cervical spine fracture.  3. Left thyroid nodules. Nonurgent ultrasound is recommended for further   evaluation.    < end of copied text >    < from: CT Abdomen and Pelvis w/ IV Cont (09.03.19 @ 00:27) >    IMPRESSION:     1. No evidence of acute trauma within the chest, abdomen and pelvis.  2. Mild CBD dilatation up to 8 mm. Recommend nonurgent MRI for further   evaluation.  3. Mild dilatation of the main pulmonary artery, which can be seen with   pulmonary arterial hypertension.  4. Small bilateral adrenal myelolipomas and small right upper pole renal   angiomyolipoma.    < end of copied text >      Consultant(s) Notes Reviewed:  Records from [facility]    Care Discussed with Consultants/Other Providers: RAFA CAVANAUGH

## 2019-09-07 NOTE — CONSULT NOTE ADULT - ASSESSMENT
70 yo F w/ DM2 on insulin, HTN, Anxiety/Depression, now admitted to [facility] following a suicide attempt via self inflicted wound to L arm and tylenol overdose.     # Depression, suicide attempt via self injury and tylenol overdose  - s/p SICU monitoring and NAC protocol  - safety precautions and medication management as per psych    # DM2 c/b hyperglycemia, on long term insulin therapy  - was also prescribed metformin and glipizide in the past  - currently on Lantus 20U qhs, HISS  - FS currently in acceptable range, goal FS <180  - continue to monitor FS and adjust regimen as needed  - if persistently elevated then will consider additional of premeal insulin vs increasing Lantus dose  - A1C 5.6 but unclear if accurate in setting of bleed/anemia    # HTN  - on hydralazine, lisinopril  - monitor BP and adjust regimen as needed    # Thyroid nodule  - incidentally found on CT scan  - TSH wnl  - can followup with PMD for non-urgent thyroid U/S for further evaluation    # macrocytic anemia  - anemia in setting of recent bleeding from self inflicted wound  - check folate/B12 70 yo F w/ DM2 on insulin, HTN, Anxiety/Depression, now admitted to [facility] following a suicide attempt via self inflicted wound to L arm and tylenol overdose.     # Depression, suicide attempt via self injury and tylenol overdose  - s/p SICU monitoring and NAC protocol  - safety precautions and medication management as per psych  - LUE wound examined, no e/o infection at this time, anticipatory guidance provided to patient, d/w nursing can continue with dry gauze and tape as done at [facility]    # DM2 c/b hyperglycemia, on long term insulin therapy  - was also taking metformin and glipizide at home  - currently on Lantus 20U qhs, HISS  - FS currently in acceptable range, goal FS <180  - continue to monitor FS and adjust regimen as needed  - if persistently elevated then will consider additional of premeal insulin vs increasing Lantus dose  - A1C 5.6 but unclear if accurate in setting of bleed/anemia  - would wish to avoid episodes of hypoglycemia in future (patient reports waking up at times because her sugars are too low)  - can resume metformin, would hold off on glipizide at this time (avoid hypoglycemia)  - diabetic diet, monitor intake    # HTN  - on hydralazine, lisinopril  - monitor BP and adjust regimen as needed    # Thyroid nodule  - incidentally found on CT scan  - TSH wnl  - can followup with PMD for non-urgent thyroid U/S for further evaluation    # macrocytic anemia  - anemia in setting of recent bleeding from self inflicted wound  - check folate/B12    # Constipation  - started bowel regimen, monitor for BMs

## 2019-09-08 LAB
FOLATE SERPL-MCNC: > 20 NG/ML — HIGH (ref 4.7–20)
VIT B12 SERPL-MCNC: 2000 PG/ML — HIGH (ref 200–900)

## 2019-09-08 PROCEDURE — 99231 SBSQ HOSP IP/OBS SF/LOW 25: CPT

## 2019-09-08 RX ORDER — MIRTAZAPINE 45 MG/1
7.5 TABLET, ORALLY DISINTEGRATING ORAL AT BEDTIME
Refills: 0 | Status: DISCONTINUED | OUTPATIENT
Start: 2019-09-08 | End: 2019-09-09

## 2019-09-08 RX ADMIN — MIRTAZAPINE 7.5 MILLIGRAM(S): 45 TABLET, ORALLY DISINTEGRATING ORAL at 21:03

## 2019-09-08 RX ADMIN — Medication 50 MILLIGRAM(S): at 21:03

## 2019-09-08 RX ADMIN — Medication 100 MILLIGRAM(S): at 21:03

## 2019-09-08 RX ADMIN — BRIMONIDINE TARTRATE 1 DROP(S): 2 SOLUTION/ DROPS OPHTHALMIC at 21:03

## 2019-09-08 RX ADMIN — LISINOPRIL 20 MILLIGRAM(S): 2.5 TABLET ORAL at 09:45

## 2019-09-08 RX ADMIN — POLYETHYLENE GLYCOL 3350 17 GRAM(S): 17 POWDER, FOR SOLUTION ORAL at 09:45

## 2019-09-08 RX ADMIN — METFORMIN HYDROCHLORIDE 500 MILLIGRAM(S): 850 TABLET ORAL at 09:45

## 2019-09-08 RX ADMIN — Medication 1: at 12:34

## 2019-09-08 RX ADMIN — INSULIN GLARGINE 20 UNIT(S): 100 INJECTION, SOLUTION SUBCUTANEOUS at 21:03

## 2019-09-08 RX ADMIN — Medication 1 DROP(S): at 09:45

## 2019-09-08 RX ADMIN — BRIMONIDINE TARTRATE 1 DROP(S): 2 SOLUTION/ DROPS OPHTHALMIC at 09:45

## 2019-09-08 RX ADMIN — SENNA PLUS 2 TABLET(S): 8.6 TABLET ORAL at 21:03

## 2019-09-08 RX ADMIN — Medication 1 DROP(S): at 21:03

## 2019-09-08 RX ADMIN — METFORMIN HYDROCHLORIDE 500 MILLIGRAM(S): 850 TABLET ORAL at 21:03

## 2019-09-08 RX ADMIN — Medication 100 MILLIGRAM(S): at 12:16

## 2019-09-08 RX ADMIN — Medication 100 MILLIGRAM(S): at 09:45

## 2019-09-08 RX ADMIN — Medication 50 MILLIGRAM(S): at 09:45

## 2019-09-08 RX ADMIN — VALACYCLOVIR 500 MILLIGRAM(S): 500 TABLET, FILM COATED ORAL at 09:45

## 2019-09-09 PROCEDURE — 99232 SBSQ HOSP IP/OBS MODERATE 35: CPT | Mod: GC

## 2019-09-09 PROCEDURE — 99232 SBSQ HOSP IP/OBS MODERATE 35: CPT

## 2019-09-09 RX ORDER — DOXEPIN HCL 100 MG
100 CAPSULE ORAL AT BEDTIME
Refills: 0 | Status: DISCONTINUED | OUTPATIENT
Start: 2019-09-09 | End: 2019-09-11

## 2019-09-09 RX ORDER — BACITRACIN ZINC 500 UNIT/G
1 OINTMENT IN PACKET (EA) TOPICAL
Refills: 0 | Status: DISCONTINUED | OUTPATIENT
Start: 2019-09-09 | End: 2019-09-11

## 2019-09-09 RX ORDER — INSULIN LISPRO 100/ML
2 VIAL (ML) SUBCUTANEOUS ONCE
Refills: 0 | Status: DISCONTINUED | OUTPATIENT
Start: 2019-09-09 | End: 2019-09-09

## 2019-09-09 RX ADMIN — BRIMONIDINE TARTRATE 1 DROP(S): 2 SOLUTION/ DROPS OPHTHALMIC at 09:05

## 2019-09-09 RX ADMIN — Medication 1 APPLICATION(S): at 18:54

## 2019-09-09 RX ADMIN — VALACYCLOVIR 500 MILLIGRAM(S): 500 TABLET, FILM COATED ORAL at 08:59

## 2019-09-09 RX ADMIN — Medication 50 MILLIGRAM(S): at 20:51

## 2019-09-09 RX ADMIN — Medication 50 MILLIGRAM(S): at 08:58

## 2019-09-09 RX ADMIN — Medication 100 MILLIGRAM(S): at 20:51

## 2019-09-09 RX ADMIN — Medication 0: at 16:16

## 2019-09-09 RX ADMIN — Medication 100 MILLIGRAM(S): at 08:58

## 2019-09-09 RX ADMIN — Medication 100 MILLIGRAM(S): at 13:57

## 2019-09-09 RX ADMIN — SENNA PLUS 2 TABLET(S): 8.6 TABLET ORAL at 20:55

## 2019-09-09 RX ADMIN — METFORMIN HYDROCHLORIDE 500 MILLIGRAM(S): 850 TABLET ORAL at 20:52

## 2019-09-09 RX ADMIN — INSULIN GLARGINE 20 UNIT(S): 100 INJECTION, SOLUTION SUBCUTANEOUS at 20:54

## 2019-09-09 RX ADMIN — BRIMONIDINE TARTRATE 1 DROP(S): 2 SOLUTION/ DROPS OPHTHALMIC at 20:51

## 2019-09-09 RX ADMIN — LISINOPRIL 20 MILLIGRAM(S): 2.5 TABLET ORAL at 08:59

## 2019-09-09 RX ADMIN — Medication 1 DROP(S): at 08:59

## 2019-09-09 RX ADMIN — Medication 1 DROP(S): at 20:52

## 2019-09-09 RX ADMIN — Medication 1 DROP(S): at 09:16

## 2019-09-09 RX ADMIN — METFORMIN HYDROCHLORIDE 500 MILLIGRAM(S): 850 TABLET ORAL at 08:59

## 2019-09-09 RX ADMIN — POLYETHYLENE GLYCOL 3350 17 GRAM(S): 17 POWDER, FOR SOLUTION ORAL at 09:29

## 2019-09-09 NOTE — PROGRESS NOTE ADULT - ASSESSMENT
70 yo F w/ DM2 on insulin, HTN, Anxiety/Depression, now admitted to [facility] following a suicide attempt via self inflicted wound to L arm and tylenol overdose.     # Depression, suicide attempt via self injury and tylenol overdose  - s/p SICU monitoring and NAC protocol  - safety precautions and medication management as per psych  - currently on remeron with ativan prn as per psych  - LUE wound: continue with dry gauze and tape     # DM2 c/b hyperglycemia, on long term insulin therapy  - was also taking metformin and glipizide at home  - currently on Lantus 20U qhs, HISS  - FS currently in acceptable range, goal FS <180  - continue to monitor FS and adjust regimen as needed  - if persistently elevated then will consider additional of premeal insulin vs increasing Lantus dose  - A1C 5.6 but unclear if accurate in setting of bleed/anemia  - would wish to avoid episodes of hypoglycemia in future (patient reports waking up at times because her sugars are too low)  - c/w metformin, would hold off on glipizide at this time (avoid hypoglycemia)  - diabetic diet, monitor intake    # HTN  - on hydralazine, lisinopril  - monitor BP and adjust regimen as needed  - if persistently elevated above goal, can increase lisinopril dose    # Thyroid nodule  - incidentally found on CT scan  - TSH wnl  - can followup with PMD for non-urgent thyroid U/S for further evaluation    # macrocytic anemia  - anemia in setting of recent bleeding from self inflicted wound  - folate and B12 not deficient    # Glaucoma   - c/w eye gtts    # Constipation  - c/w bowel regimen, monitor for BMs

## 2019-09-09 NOTE — PROGRESS NOTE ADULT - SUBJECTIVE AND OBJECTIVE BOX
CC/Reason for Consult: Diabetes and HTN management    SUBJECTIVE / OVERNIGHT EVENTS:    MEDICATIONS  (STANDING):  brimonidine 0.2% Ophthalmic Solution 1 Drop(s) Both EYES every 12 hours  docusate sodium 100 milliGRAM(s) Oral three times a day  hydrALAZINE 50 milliGRAM(s) Oral two times a day  insulin glargine Injectable (LANTUS) 20 Unit(s) SubCutaneous at bedtime  insulin lispro (HumaLOG) corrective regimen sliding scale   SubCutaneous three times a day before meals  insulin lispro (HumaLOG) corrective regimen sliding scale   SubCutaneous at bedtime  ketorolac 0.5% Ophthalmic Solution 1 Drop(s) Both EYES daily  lisinopril 20 milliGRAM(s) Oral daily  metFORMIN 500 milliGRAM(s) Oral two times a day  mirtazapine 7.5 milliGRAM(s) Oral at bedtime  polyethylene glycol 3350 17 Gram(s) Oral daily  senna 2 Tablet(s) Oral at bedtime  timolol 0.5% Solution 1 Drop(s) Both EYES every 12 hours  valACYclovir 500 milliGRAM(s) Oral daily    MEDICATIONS  (PRN):  diphenhydrAMINE 50 milliGRAM(s) Oral every 6 hours PRN EPS ppx/agitation  diphenhydrAMINE   Injectable 50 milliGRAM(s) IntraMuscular every 6 hours PRN EPS ppx/severe agitation  haloperidol     Tablet 5 milliGRAM(s) Oral every 6 hours PRN agitation  haloperidol    Injectable 5 milliGRAM(s) IntraMuscular once PRN severe agitation  LORazepam     Tablet 2 milliGRAM(s) Oral every 6 hours PRN anxiety  LORazepam   Injectable 2 milliGRAM(s) IntraMuscular once PRN severe agitation      Vital Signs Last 24 Hrs  T(C): 36.2 (09 Sep 2019 07:32), Max: 36.2 (09 Sep 2019 07:32)  T(F): 97.2 (09 Sep 2019 07:32), Max: 97.2 (09 Sep 2019 07:32)  HR: 79  BP: 151/93  BP(mean): --  RR: --  SpO2: --    CAPILLARY BLOOD GLUCOSE  POCT Blood Glucose.: 143 mg/dL (09 Sep 2019 07:27)  POCT Blood Glucose.: 163 mg/dL (08 Sep 2019 20:25)  POCT Blood Glucose.: 126 mg/dL (08 Sep 2019 16:07)  POCT Blood Glucose.: 156 mg/dL (08 Sep 2019 11:39)    PHYSICAL EXAM:  GENERAL:   HEAD:    EYES:   NECK:  CHEST/LUNG:   HEART:   ABDOMEN:   EXTREMITIES:   PSYCH:   NEUROLOGY:   SKIN:     LABS:  Vitamin B12, Serum: 2000 pg/mL (09.08.19 @ 10:32)  Folate, Serum: > 20.0: CC/Reason for Consult: Diabetes and HTN management    SUBJECTIVE / OVERNIGHT EVENTS:  Reports some difficulty sleeping last night, attributes to delay in getting her medication. Otherwise doing well, happy to report she had 1 soft BM. Is working hard at learning coping strategies to keep the issues with her family "from getting to my heart". Had arm dressing changed yesterday. No pain/fever/chills.     MEDICATIONS  (STANDING):  brimonidine 0.2% Ophthalmic Solution 1 Drop(s) Both EYES every 12 hours  docusate sodium 100 milliGRAM(s) Oral three times a day  hydrALAZINE 50 milliGRAM(s) Oral two times a day  insulin glargine Injectable (LANTUS) 20 Unit(s) SubCutaneous at bedtime  insulin lispro (HumaLOG) corrective regimen sliding scale   SubCutaneous three times a day before meals  insulin lispro (HumaLOG) corrective regimen sliding scale   SubCutaneous at bedtime  ketorolac 0.5% Ophthalmic Solution 1 Drop(s) Both EYES daily  lisinopril 20 milliGRAM(s) Oral daily  metFORMIN 500 milliGRAM(s) Oral two times a day  mirtazapine 7.5 milliGRAM(s) Oral at bedtime  polyethylene glycol 3350 17 Gram(s) Oral daily  senna 2 Tablet(s) Oral at bedtime  timolol 0.5% Solution 1 Drop(s) Both EYES every 12 hours  valACYclovir 500 milliGRAM(s) Oral daily    MEDICATIONS  (PRN):  diphenhydrAMINE 50 milliGRAM(s) Oral every 6 hours PRN EPS ppx/agitation  diphenhydrAMINE   Injectable 50 milliGRAM(s) IntraMuscular every 6 hours PRN EPS ppx/severe agitation  haloperidol     Tablet 5 milliGRAM(s) Oral every 6 hours PRN agitation  haloperidol    Injectable 5 milliGRAM(s) IntraMuscular once PRN severe agitation  LORazepam     Tablet 2 milliGRAM(s) Oral every 6 hours PRN anxiety  LORazepam   Injectable 2 milliGRAM(s) IntraMuscular once PRN severe agitation      Vital Signs Last 24 Hrs  T(C): 36.2 (09 Sep 2019 07:32), Max: 36.2 (09 Sep 2019 07:32)  T(F): 97.2 (09 Sep 2019 07:32), Max: 97.2 (09 Sep 2019 07:32)  HR: 79  BP: 151/93  BP(mean): --  RR: --  SpO2: --    CAPILLARY BLOOD GLUCOSE  POCT Blood Glucose.: 143 mg/dL (09 Sep 2019 07:27)  POCT Blood Glucose.: 163 mg/dL (08 Sep 2019 20:25)  POCT Blood Glucose.: 126 mg/dL (08 Sep 2019 16:07)  POCT Blood Glucose.: 156 mg/dL (08 Sep 2019 11:39)    PHYSICAL EXAM:  GENERAL: NAD, walking around room  HEAD:  NC/at  EYES: EOMI, clear sclera/conjunctiva  NECK: supple  CHEST/LUNG: Comfortable on RA, speaking in full sentences   EXTREMITIES: L arm dressing in place (AC fossa and wrist) c/d/i  PSYCH: calm, pleasant  NEUROLOGY: moving all extremities, ambulating independently, non-focal     LABS:  Vitamin B12, Serum: 2000 pg/mL (09.08.19 @ 10:32)  Folate, Serum: > 20.0:

## 2019-09-10 PROCEDURE — 99232 SBSQ HOSP IP/OBS MODERATE 35: CPT

## 2019-09-10 RX ORDER — VALACYCLOVIR 500 MG/1
1 TABLET, FILM COATED ORAL
Qty: 0 | Refills: 0 | DISCHARGE

## 2019-09-10 RX ORDER — INSULIN DETEMIR 100/ML (3)
20 INSULIN PEN (ML) SUBCUTANEOUS
Qty: 0 | Refills: 0 | DISCHARGE

## 2019-09-10 RX ORDER — VALACYCLOVIR 500 MG/1
1 TABLET, FILM COATED ORAL
Qty: 30 | Refills: 0
Start: 2019-09-10 | End: 2019-10-09

## 2019-09-10 RX ORDER — METFORMIN HYDROCHLORIDE 850 MG/1
1 TABLET ORAL
Qty: 0 | Refills: 0 | DISCHARGE

## 2019-09-10 RX ORDER — LISINOPRIL 2.5 MG/1
1 TABLET ORAL
Qty: 30 | Refills: 0
Start: 2019-09-10 | End: 2019-10-09

## 2019-09-10 RX ORDER — INSULIN DETEMIR 100/ML (3)
20 INSULIN PEN (ML) SUBCUTANEOUS
Qty: 20 | Refills: 0
Start: 2019-09-10 | End: 2019-10-09

## 2019-09-10 RX ORDER — METFORMIN HYDROCHLORIDE 850 MG/1
1 TABLET ORAL
Qty: 60 | Refills: 0
Start: 2019-09-10 | End: 2019-10-09

## 2019-09-10 RX ORDER — DOXEPIN HCL 100 MG
1 CAPSULE ORAL
Qty: 30 | Refills: 0
Start: 2019-09-10 | End: 2019-10-09

## 2019-09-10 RX ADMIN — Medication 100 MILLIGRAM(S): at 11:09

## 2019-09-10 RX ADMIN — BRIMONIDINE TARTRATE 1 DROP(S): 2 SOLUTION/ DROPS OPHTHALMIC at 11:09

## 2019-09-10 RX ADMIN — Medication 100 MILLIGRAM(S): at 21:28

## 2019-09-10 RX ADMIN — VALACYCLOVIR 500 MILLIGRAM(S): 500 TABLET, FILM COATED ORAL at 11:09

## 2019-09-10 RX ADMIN — Medication 50 MILLIGRAM(S): at 21:29

## 2019-09-10 RX ADMIN — Medication 1 DROP(S): at 11:10

## 2019-09-10 RX ADMIN — Medication 50 MILLIGRAM(S): at 11:09

## 2019-09-10 RX ADMIN — BRIMONIDINE TARTRATE 1 DROP(S): 2 SOLUTION/ DROPS OPHTHALMIC at 21:28

## 2019-09-10 RX ADMIN — METFORMIN HYDROCHLORIDE 500 MILLIGRAM(S): 850 TABLET ORAL at 21:30

## 2019-09-10 RX ADMIN — SENNA PLUS 2 TABLET(S): 8.6 TABLET ORAL at 21:30

## 2019-09-10 RX ADMIN — INSULIN GLARGINE 20 UNIT(S): 100 INJECTION, SOLUTION SUBCUTANEOUS at 21:30

## 2019-09-10 RX ADMIN — Medication 1 DROP(S): at 21:30

## 2019-09-10 RX ADMIN — Medication 100 MILLIGRAM(S): at 21:29

## 2019-09-10 RX ADMIN — LISINOPRIL 20 MILLIGRAM(S): 2.5 TABLET ORAL at 11:09

## 2019-09-10 RX ADMIN — METFORMIN HYDROCHLORIDE 500 MILLIGRAM(S): 850 TABLET ORAL at 11:09

## 2019-09-10 RX ADMIN — POLYETHYLENE GLYCOL 3350 17 GRAM(S): 17 POWDER, FOR SOLUTION ORAL at 11:09

## 2019-09-10 NOTE — PROGRESS NOTE ADULT - ASSESSMENT
68 yo F w/ DM2 on insulin, HTN, Anxiety/Depression, now admitted to [facility] following a suicide attempt via self inflicted wound to L arm and tylenol overdose.     # Depression, suicide attempt via self injury and tylenol overdose  - s/p SICU monitoring and NAC protocol  - safety precautions and medication management as per psych  - currently on doxepin with ativan prn as per psych  - LUE wound: continue with dry gauze and tape     # DM2 c/b hyperglycemia, on long term insulin therapy  - was also taking metformin and glipizide at home  - currently on Lantus 20U qhs, HISS  - FS currently in acceptable range, goal FS <180  - continue to monitor FS and adjust regimen as needed  - if persistently elevated then will consider additional of premeal insulin vs increasing Lantus dose  - A1C 5.6 but unclear if accurate in setting of bleed/anemia  - would wish to avoid episodes of hypoglycemia in future (patient reports waking up at times because her sugars are too low)  - c/w metformin, would hold off on glipizide at this time (avoid hypoglycemia)  - diabetic diet, monitor intake    # HTN  - on hydralazine, lisinopril  - monitor BP and adjust regimen as needed  - if persistently elevated above goal, can increase lisinopril dose (to 40 mg)    # Thyroid nodule  - incidentally found on CT scan  - TSH wnl  - can followup with PMD for non-urgent thyroid U/S for further evaluation    # macrocytic anemia  - anemia in setting of recent bleeding from self inflicted wound  - folate and B12 not deficient    # Glaucoma   - c/w eye gtts    # Constipation  - c/w bowel regimen, monitor for BMs

## 2019-09-10 NOTE — PROGRESS NOTE ADULT - SUBJECTIVE AND OBJECTIVE BOX
CC/Reason for Consult: Diabetes and HTN management    SUBJECTIVE / OVERNIGHT EVENTS:    MEDICATIONS  (STANDING):  brimonidine 0.2% Ophthalmic Solution 1 Drop(s) Both EYES every 12 hours  docusate sodium 100 milliGRAM(s) Oral three times a day  doxepin 100 milliGRAM(s) Oral at bedtime  hydrALAZINE 50 milliGRAM(s) Oral two times a day  insulin glargine Injectable (LANTUS) 20 Unit(s) SubCutaneous at bedtime  insulin lispro (HumaLOG) corrective regimen sliding scale   SubCutaneous three times a day before meals  insulin lispro (HumaLOG) corrective regimen sliding scale   SubCutaneous at bedtime  ketorolac 0.5% Ophthalmic Solution 1 Drop(s) Both EYES daily  lisinopril 20 milliGRAM(s) Oral daily  metFORMIN 500 milliGRAM(s) Oral two times a day  polyethylene glycol 3350 17 Gram(s) Oral daily  senna 2 Tablet(s) Oral at bedtime  timolol 0.5% Solution 1 Drop(s) Both EYES every 12 hours  valACYclovir 500 milliGRAM(s) Oral daily    MEDICATIONS  (PRN):  BACItracin   Ointment 1 Application(s) Topical two times a day PRN wound care  diphenhydrAMINE 50 milliGRAM(s) Oral every 6 hours PRN EPS ppx/agitation  diphenhydrAMINE   Injectable 50 milliGRAM(s) IntraMuscular every 6 hours PRN EPS ppx/severe agitation  haloperidol     Tablet 5 milliGRAM(s) Oral every 6 hours PRN agitation  haloperidol    Injectable 5 milliGRAM(s) IntraMuscular once PRN severe agitation  LORazepam     Tablet 2 milliGRAM(s) Oral every 6 hours PRN anxiety  LORazepam   Injectable 2 milliGRAM(s) IntraMuscular once PRN severe agitation      Vital Signs Last 24 Hrs  T(C): 36.7 (10 Sep 2019 06:46), Max: 36.7 (10 Sep 2019 06:46)  T(F): 98.1 (10 Sep 2019 06:46), Max: 98.1 (10 Sep 2019 06:46)  HR: 74  BP: 150/76  BP(mean): --  RR: --  SpO2: --    CAPILLARY BLOOD GLUCOSE  POCT Blood Glucose.: 105 mg/dL (10 Sep 2019 07:53)  POCT Blood Glucose.: 135 mg/dL (09 Sep 2019 20:53)  POCT Blood Glucose.: 77 mg/dL (09 Sep 2019 16:14)  POCT Blood Glucose.: 228 mg/dL (09 Sep 2019 11:49)        PHYSICAL EXAM:  GENERAL:   HEAD:    EYES:  NECK:   CHEST/LUNG:   HEART:   ABDOMEN:  EXTREMITIES:    PSYCH:   NEUROLOGY:   SKIN: CC/Reason for Consult: Diabetes and HTN management    SUBJECTIVE / OVERNIGHT EVENTS:  Reports sleeping better. Wishes her daughter would have showed more concern to how she was feeling before all this happened. Remains focused on getting better, learning coping skills. Takes strength from Sumaya (her wolf). Would like to know recommendations for good providers on the outside for followup (feels like her current otpt providers are not able to spend enough time with her).     MEDICATIONS  (STANDING):  brimonidine 0.2% Ophthalmic Solution 1 Drop(s) Both EYES every 12 hours  docusate sodium 100 milliGRAM(s) Oral three times a day  doxepin 100 milliGRAM(s) Oral at bedtime  hydrALAZINE 50 milliGRAM(s) Oral two times a day  insulin glargine Injectable (LANTUS) 20 Unit(s) SubCutaneous at bedtime  insulin lispro (HumaLOG) corrective regimen sliding scale   SubCutaneous three times a day before meals  insulin lispro (HumaLOG) corrective regimen sliding scale   SubCutaneous at bedtime  ketorolac 0.5% Ophthalmic Solution 1 Drop(s) Both EYES daily  lisinopril 20 milliGRAM(s) Oral daily  metFORMIN 500 milliGRAM(s) Oral two times a day  polyethylene glycol 3350 17 Gram(s) Oral daily  senna 2 Tablet(s) Oral at bedtime  timolol 0.5% Solution 1 Drop(s) Both EYES every 12 hours  valACYclovir 500 milliGRAM(s) Oral daily    MEDICATIONS  (PRN):  BACItracin   Ointment 1 Application(s) Topical two times a day PRN wound care  diphenhydrAMINE 50 milliGRAM(s) Oral every 6 hours PRN EPS ppx/agitation  diphenhydrAMINE   Injectable 50 milliGRAM(s) IntraMuscular every 6 hours PRN EPS ppx/severe agitation  haloperidol     Tablet 5 milliGRAM(s) Oral every 6 hours PRN agitation  haloperidol    Injectable 5 milliGRAM(s) IntraMuscular once PRN severe agitation  LORazepam     Tablet 2 milliGRAM(s) Oral every 6 hours PRN anxiety  LORazepam   Injectable 2 milliGRAM(s) IntraMuscular once PRN severe agitation      Vital Signs Last 24 Hrs  T(C): 36.7 (10 Sep 2019 06:46), Max: 36.7 (10 Sep 2019 06:46)  T(F): 98.1 (10 Sep 2019 06:46), Max: 98.1 (10 Sep 2019 06:46)  HR: 74  BP: 150/76  BP(mean): --  RR: --  SpO2: --    CAPILLARY BLOOD GLUCOSE  POCT Blood Glucose.: 105 mg/dL (10 Sep 2019 07:53)  POCT Blood Glucose.: 135 mg/dL (09 Sep 2019 20:53)  POCT Blood Glucose.: 77 mg/dL (09 Sep 2019 16:14)  POCT Blood Glucose.: 228 mg/dL (09 Sep 2019 11:49)    PHYSICAL EXAM:  GENERAL: NAD, sitting in common area  HEAD:  NC/AT   EYES: EOMI, clear sclera/conjunctiva  NECK: supple  CHEST/LUNG: Comfortable on RA, speaking in full sentences   EXTREMITIES:  LUE dressings in place, c/d/i  PSYCH: calm, pleasant, smiling at times  NEUROLOGY: moving all four extremities, non-focal

## 2019-09-11 VITALS — HEART RATE: 80 BPM | SYSTOLIC BLOOD PRESSURE: 142 MMHG | DIASTOLIC BLOOD PRESSURE: 80 MMHG | TEMPERATURE: 98 F

## 2019-09-11 PROCEDURE — 99238 HOSP IP/OBS DSCHRG MGMT 30/<: CPT

## 2019-09-11 RX ORDER — INSULIN GLARGINE 100 [IU]/ML
0.2 INJECTION, SOLUTION SUBCUTANEOUS
Qty: 3 | Refills: 0
Start: 2019-09-11 | End: 2019-09-24

## 2019-09-11 RX ORDER — TIMOLOL 0.5 %
1 DROPS OPHTHALMIC (EYE)
Qty: 1 | Refills: 0
Start: 2019-09-11 | End: 2019-09-24

## 2019-09-11 RX ORDER — BRIMONIDINE TARTRATE 2 MG/MG
1 SOLUTION/ DROPS OPHTHALMIC
Qty: 1 | Refills: 0
Start: 2019-09-11 | End: 2019-09-24

## 2019-09-11 RX ORDER — INSULIN DETEMIR 100/ML (3)
0.2 INSULIN PEN (ML) SUBCUTANEOUS
Qty: 3 | Refills: 0
Start: 2019-09-11 | End: 2019-09-24

## 2019-09-11 RX ORDER — BRIMONIDINE TARTRATE, TIMOLOL MALEATE 2; 5 MG/ML; MG/ML
1 SOLUTION/ DROPS OPHTHALMIC
Qty: 0 | Refills: 0 | DISCHARGE

## 2019-09-11 RX ORDER — DOCUSATE SODIUM 100 MG
1 CAPSULE ORAL
Qty: 42 | Refills: 0
Start: 2019-09-11 | End: 2019-09-24

## 2019-09-11 RX ORDER — DOXEPIN HCL 100 MG
1 CAPSULE ORAL
Qty: 0 | Refills: 0 | DISCHARGE

## 2019-09-11 RX ORDER — METFORMIN HYDROCHLORIDE 850 MG/1
1 TABLET ORAL
Qty: 28 | Refills: 0
Start: 2019-09-11 | End: 2019-09-24

## 2019-09-11 RX ORDER — HYDRALAZINE HCL 50 MG
1 TABLET ORAL
Qty: 28 | Refills: 0
Start: 2019-09-11 | End: 2019-09-24

## 2019-09-11 RX ORDER — SENNA PLUS 8.6 MG/1
2 TABLET ORAL
Qty: 28 | Refills: 0
Start: 2019-09-11 | End: 2019-09-24

## 2019-09-11 RX ORDER — LISINOPRIL 2.5 MG/1
1 TABLET ORAL
Qty: 0 | Refills: 0 | DISCHARGE

## 2019-09-11 RX ORDER — DOXEPIN HCL 100 MG
1 CAPSULE ORAL
Qty: 14 | Refills: 0
Start: 2019-09-11 | End: 2019-09-24

## 2019-09-11 RX ORDER — KETOROLAC TROMETHAMINE 0.5 %
1 DROPS OPHTHALMIC (EYE)
Qty: 1 | Refills: 0
Start: 2019-09-11 | End: 2019-09-24

## 2019-09-11 RX ORDER — LISINOPRIL 2.5 MG/1
1 TABLET ORAL
Qty: 14 | Refills: 0
Start: 2019-09-11 | End: 2019-09-24

## 2019-09-11 RX ORDER — VALACYCLOVIR 500 MG/1
1 TABLET, FILM COATED ORAL
Qty: 14 | Refills: 0
Start: 2019-09-11 | End: 2019-09-24

## 2019-09-11 RX ORDER — HYDRALAZINE HCL 50 MG
1 TABLET ORAL
Qty: 0 | Refills: 0 | DISCHARGE

## 2019-09-11 RX ORDER — BROMFENAC 0.76 MG/ML
1 SOLUTION/ DROPS OPHTHALMIC
Qty: 0 | Refills: 0 | DISCHARGE

## 2019-09-11 RX ORDER — POLYETHYLENE GLYCOL 3350 17 G/17G
17 POWDER, FOR SOLUTION ORAL
Qty: 0 | Refills: 0 | DISCHARGE
Start: 2019-09-11

## 2019-09-11 RX ADMIN — METFORMIN HYDROCHLORIDE 500 MILLIGRAM(S): 850 TABLET ORAL at 09:06

## 2019-09-11 RX ADMIN — VALACYCLOVIR 500 MILLIGRAM(S): 500 TABLET, FILM COATED ORAL at 09:06

## 2019-09-11 RX ADMIN — Medication 50 MILLIGRAM(S): at 09:06

## 2019-09-11 RX ADMIN — Medication 1 DROP(S): at 09:06

## 2019-09-11 RX ADMIN — Medication 100 MILLIGRAM(S): at 09:06

## 2019-09-11 RX ADMIN — Medication 100 MILLIGRAM(S): at 12:19

## 2019-09-11 RX ADMIN — LISINOPRIL 20 MILLIGRAM(S): 2.5 TABLET ORAL at 09:06

## 2019-09-11 RX ADMIN — POLYETHYLENE GLYCOL 3350 17 GRAM(S): 17 POWDER, FOR SOLUTION ORAL at 09:06

## 2019-09-11 RX ADMIN — Medication 1: at 12:19

## 2019-09-11 RX ADMIN — BRIMONIDINE TARTRATE 1 DROP(S): 2 SOLUTION/ DROPS OPHTHALMIC at 09:05

## 2020-03-09 NOTE — PATIENT PROFILE ADULT - NSPROMUTANXFEARADDRESSFT_GEN_A_NUR
Bi-Rhombic Flap Text: The defect edges were debeveled with a #15 scalpel blade.  Given the location of the defect and the proximity to free margins a bi-rhombic flap was deemed most appropriate.  Using a sterile surgical marker, an appropriate rhombic flap was drawn incorporating the defect. The area thus outlined was incised deep to adipose tissue with a #15 scalpel blade.  The skin margins were undermined to an appropriate distance in all directions utilizing iris scissors. n/a

## 2021-01-01 NOTE — BEHAVIORAL HEALTH ASSESSMENT NOTE - FUND OF KNOWLEDGE
attending mdm: 5 mth old female, with DORV, baseline sat 75-85%, VSD, coarct of aorta s/p repair, noted to have abnl pulse ox at home 34-98% at home, + cough and congestion at home. no cyanosis. mom called cardiologist who advised to bring her to ED. lasix BID, attending mdm: 5 mth old female, with DORV, baseline sat 75-85%, VSD, coarct of aorta s/p repair, noted to have abnl pulse ox at home 34-98% at home, + cough and congestion at home. no cyanosis. mom called cardiologist who advised to bring her to ED. lasix BID, on exam, pt at baseline, playful. + murmur. + congestion. clear lungs. remainder of exam normal. A/P normal sats whle in the ED, fed well. discussed with pt's private cardio, will f/u tomorrow, stable for dc home. Dandre Vivar MD Attending Normal

## 2023-04-06 PROBLEM — F41.9 ANXIETY DISORDER, UNSPECIFIED: Chronic | Status: ACTIVE | Noted: 2019-09-03

## 2023-04-26 ENCOUNTER — APPOINTMENT (OUTPATIENT)
Dept: ULTRASOUND IMAGING | Facility: CLINIC | Age: 73
End: 2023-04-26
Payer: MEDICARE

## 2023-04-26 ENCOUNTER — RESULT REVIEW (OUTPATIENT)
Age: 73
End: 2023-04-26

## 2023-04-26 ENCOUNTER — OUTPATIENT (OUTPATIENT)
Dept: OUTPATIENT SERVICES | Facility: HOSPITAL | Age: 73
LOS: 1 days | End: 2023-04-26
Payer: COMMERCIAL

## 2023-04-26 DIAGNOSIS — R42 DIZZINESS AND GIDDINESS: ICD-10-CM

## 2023-04-26 PROCEDURE — 93880 EXTRACRANIAL BILAT STUDY: CPT

## 2023-04-26 PROCEDURE — 93880 EXTRACRANIAL BILAT STUDY: CPT | Mod: 26

## 2025-01-29 ENCOUNTER — INPATIENT (INPATIENT)
Facility: HOSPITAL | Age: 75
LOS: 0 days | Discharge: HOME | End: 2025-01-30
Attending: STUDENT IN AN ORGANIZED HEALTH CARE EDUCATION/TRAINING PROGRAM | Admitting: STUDENT IN AN ORGANIZED HEALTH CARE EDUCATION/TRAINING PROGRAM
Payer: MEDICARE

## 2025-01-29 VITALS
HEART RATE: 78 BPM | OXYGEN SATURATION: 98 % | TEMPERATURE: 98 F | DIASTOLIC BLOOD PRESSURE: 78 MMHG | HEIGHT: 65 IN | RESPIRATION RATE: 18 BRPM | WEIGHT: 179.9 LBS | SYSTOLIC BLOOD PRESSURE: 128 MMHG

## 2025-01-29 DIAGNOSIS — E87.1 HYPO-OSMOLALITY AND HYPONATREMIA: ICD-10-CM

## 2025-01-29 LAB
ALBUMIN SERPL ELPH-MCNC: 4.1 G/DL — SIGNIFICANT CHANGE UP (ref 3.3–5)
ALP SERPL-CCNC: 47 U/L — SIGNIFICANT CHANGE UP (ref 40–120)
ALT FLD-CCNC: 20 U/L — SIGNIFICANT CHANGE UP (ref 4–33)
ANION GAP SERPL CALC-SCNC: 14 MMOL/L — SIGNIFICANT CHANGE UP (ref 7–14)
ANION GAP SERPL CALC-SCNC: 16 MMOL/L — HIGH (ref 7–14)
AST SERPL-CCNC: 28 U/L — SIGNIFICANT CHANGE UP (ref 4–32)
BASOPHILS # BLD AUTO: 0.03 K/UL — SIGNIFICANT CHANGE UP (ref 0–0.2)
BASOPHILS NFR BLD AUTO: 0.8 % — SIGNIFICANT CHANGE UP (ref 0–2)
BILIRUB SERPL-MCNC: <0.2 MG/DL — SIGNIFICANT CHANGE UP (ref 0.2–1.2)
BUN SERPL-MCNC: 6 MG/DL — LOW (ref 7–23)
BUN SERPL-MCNC: 6 MG/DL — LOW (ref 7–23)
CALCIUM SERPL-MCNC: 9.2 MG/DL — SIGNIFICANT CHANGE UP (ref 8.4–10.5)
CALCIUM SERPL-MCNC: 9.2 MG/DL — SIGNIFICANT CHANGE UP (ref 8.4–10.5)
CHLORIDE SERPL-SCNC: 92 MMOL/L — LOW (ref 98–107)
CHLORIDE SERPL-SCNC: 96 MMOL/L — LOW (ref 98–107)
CO2 SERPL-SCNC: 18 MMOL/L — LOW (ref 22–31)
CO2 SERPL-SCNC: 19 MMOL/L — LOW (ref 22–31)
CREAT SERPL-MCNC: 0.49 MG/DL — LOW (ref 0.5–1.3)
CREAT SERPL-MCNC: 0.51 MG/DL — SIGNIFICANT CHANGE UP (ref 0.5–1.3)
EGFR: 98 ML/MIN/1.73M2 — SIGNIFICANT CHANGE UP
EGFR: 99 ML/MIN/1.73M2 — SIGNIFICANT CHANGE UP
EOSINOPHIL # BLD AUTO: 0.02 K/UL — SIGNIFICANT CHANGE UP (ref 0–0.5)
EOSINOPHIL NFR BLD AUTO: 0.6 % — SIGNIFICANT CHANGE UP (ref 0–6)
GAS PNL BLDV: SIGNIFICANT CHANGE UP
GLUCOSE BLDC GLUCOMTR-MCNC: 139 MG/DL — HIGH (ref 70–99)
GLUCOSE SERPL-MCNC: 152 MG/DL — HIGH (ref 70–99)
GLUCOSE SERPL-MCNC: 154 MG/DL — HIGH (ref 70–99)
HCT VFR BLD CALC: 30.8 % — LOW (ref 34.5–45)
HGB BLD-MCNC: 10.6 G/DL — LOW (ref 11.5–15.5)
IANC: 1.47 K/UL — LOW (ref 1.8–7.4)
IMM GRANULOCYTES NFR BLD AUTO: 0.3 % — SIGNIFICANT CHANGE UP (ref 0–0.9)
LYMPHOCYTES # BLD AUTO: 1.4 K/UL — SIGNIFICANT CHANGE UP (ref 1–3.3)
LYMPHOCYTES # BLD AUTO: 39.7 % — SIGNIFICANT CHANGE UP (ref 13–44)
MCHC RBC-ENTMCNC: 31.9 PG — SIGNIFICANT CHANGE UP (ref 27–34)
MCHC RBC-ENTMCNC: 34.4 G/DL — SIGNIFICANT CHANGE UP (ref 32–36)
MCV RBC AUTO: 92.8 FL — SIGNIFICANT CHANGE UP (ref 80–100)
MONOCYTES # BLD AUTO: 0.6 K/UL — SIGNIFICANT CHANGE UP (ref 0–0.9)
MONOCYTES NFR BLD AUTO: 17 % — HIGH (ref 2–14)
NEUTROPHILS # BLD AUTO: 1.47 K/UL — LOW (ref 1.8–7.4)
NEUTROPHILS NFR BLD AUTO: 41.6 % — LOW (ref 43–77)
NRBC # BLD AUTO: 0 K/UL — SIGNIFICANT CHANGE UP (ref 0–0)
NRBC # BLD: 0 /100 WBCS — SIGNIFICANT CHANGE UP (ref 0–0)
NRBC # FLD: 0 K/UL — SIGNIFICANT CHANGE UP (ref 0–0)
NRBC BLD-RTO: 0 /100 WBCS — SIGNIFICANT CHANGE UP (ref 0–0)
PLATELET # BLD AUTO: 274 K/UL — SIGNIFICANT CHANGE UP (ref 150–400)
POTASSIUM SERPL-MCNC: 3.9 MMOL/L — SIGNIFICANT CHANGE UP (ref 3.5–5.3)
POTASSIUM SERPL-MCNC: 4.3 MMOL/L — SIGNIFICANT CHANGE UP (ref 3.5–5.3)
POTASSIUM SERPL-SCNC: 3.9 MMOL/L — SIGNIFICANT CHANGE UP (ref 3.5–5.3)
POTASSIUM SERPL-SCNC: 4.3 MMOL/L — SIGNIFICANT CHANGE UP (ref 3.5–5.3)
PROT SERPL-MCNC: 7.1 G/DL — SIGNIFICANT CHANGE UP (ref 6–8.3)
RBC # BLD: 3.32 M/UL — LOW (ref 3.8–5.2)
RBC # FLD: 13.4 % — SIGNIFICANT CHANGE UP (ref 10.3–14.5)
SODIUM SERPL-SCNC: 126 MMOL/L — LOW (ref 135–145)
SODIUM SERPL-SCNC: 129 MMOL/L — LOW (ref 135–145)
WBC # BLD: 3.53 K/UL — LOW (ref 3.8–10.5)
WBC # FLD AUTO: 3.53 K/UL — LOW (ref 3.8–10.5)

## 2025-01-29 PROCEDURE — 70450 CT HEAD/BRAIN W/O DYE: CPT | Mod: 26

## 2025-01-29 PROCEDURE — 99223 1ST HOSP IP/OBS HIGH 75: CPT

## 2025-01-29 PROCEDURE — 99497 ADVNCD CARE PLAN 30 MIN: CPT | Mod: 25

## 2025-01-29 PROCEDURE — 99285 EMERGENCY DEPT VISIT HI MDM: CPT

## 2025-01-29 RX ORDER — SODIUM CHLORIDE 9 G/ML
1000 INJECTION, SOLUTION INTRAVENOUS ONCE
Refills: 0 | Status: COMPLETED | OUTPATIENT
Start: 2025-01-29 | End: 2025-01-29

## 2025-01-29 RX ORDER — ACETAMINOPHEN 160 MG/5ML
1000 SUSPENSION ORAL ONCE
Refills: 0 | Status: COMPLETED | OUTPATIENT
Start: 2025-01-29 | End: 2025-01-29

## 2025-01-29 RX ORDER — METOCLOPRAMIDE 10 MG/1
10 TABLET ORAL ONCE
Refills: 0 | Status: COMPLETED | OUTPATIENT
Start: 2025-01-29 | End: 2025-01-29

## 2025-01-29 RX ADMIN — SODIUM CHLORIDE 1000 MILLILITER(S): 9 INJECTION, SOLUTION INTRAVENOUS at 17:04

## 2025-01-29 RX ADMIN — METOCLOPRAMIDE 10 MILLIGRAM(S): 10 TABLET ORAL at 17:04

## 2025-01-29 RX ADMIN — ACETAMINOPHEN 400 MILLIGRAM(S): 160 SUSPENSION ORAL at 17:04

## 2025-01-29 NOTE — H&P ADULT - PROBLEM SELECTOR PLAN 8
VTE PPx: Lovenox  Nutrition: CC Diet  Electrolytes: Maintain K>4, Mag >2, Phos > 3  Access: PIV  Code Status: FULL  Dispo: pending clinical improvement

## 2025-01-29 NOTE — H&P ADULT - PROBLEM SELECTOR PLAN 3
-Continue with home Hydralazine 100 mg TID, Lisinopril 40 mg QD, Nifedipine XR 90 mg QD  -Monitor BP

## 2025-01-29 NOTE — ED ADULT TRIAGE NOTE - CHIEF COMPLAINT QUOTE
Pt presents to ED via EMS from home with c/o generalized weakness and low sodium. Pt was seen outpatient by PMD had lab work which showed sodium 124. Pt endorses lack of appetite and generalized weakness. Pt has hx of HTN, HLD, DM2.

## 2025-01-29 NOTE — ED PROVIDER NOTE - OBJECTIVE STATEMENT
Attendin-year-old female with history of hypertension and diabetes presents with weakness fatigue unsteady gait and headache for approximately 1 to 2 weeks.  Patient was seen by her PCP this week.  Her sodium was 124 yesterday so she was sent to the emergency department for evaluation.  Apparently her sodium was also low a month ago but it was worse today so she was sent in for treatment.  No fever or chills.  No nausea or vomiting.  She has had decreased p.o. intake and early satiety recently.  No abdominal pain

## 2025-01-29 NOTE — H&P ADULT - NSHPREVIEWOFSYSTEMS_GEN_ALL_CORE
- CONSTITUTIONAL: Denies weight loss, fever and chills. +generalized weakness  - HEENT: Denies changes in vision and hearing.  - RESPIRATORY: Denies SOB and cough.  - CV: Denies palpitations and CP.  - GI: Denies abdominal pain, nausea, vomiting and diarrhea.  - : Denies dysuria and urinary frequency.  - MSK: +chronic back pain  - SKIN: Denies rash and pruritus.  - NEUROLOGICAL: Denies headache and syncope. +lightheadedness  - PSYCHIATRIC: Denies recent changes in mood. Denies anxiety and depression.    A 12-point review of systems was completed and is otherwise negative except as noted in the HPI.

## 2025-01-29 NOTE — ED PROVIDER NOTE - PROGRESS NOTE DETAILS
[place] PGY3 - Lab work notable for sodium 126, repeat 129.  Patient persistently weak and having headache.  CT head without any acute changes.  Admit to medicine for weakness and hyponatremia.

## 2025-01-29 NOTE — ED PROVIDER NOTE - NSICDXPASTMEDICALHX_GEN_ALL_CORE_FT
PAST MEDICAL HISTORY:  Anxiety and depression     Diabetes mellitus     Hypertension, unspecified type

## 2025-01-29 NOTE — H&P ADULT - NSHPPHYSICALEXAM_GEN_ALL_CORE
- GENERAL: Alert and oriented x 3. No acute distress.  - EYES: EOMI. Anicteric.  - HENT: No scleral icterus. No cervical lymphadenopathy.  - LUNGS: Clear to auscultation bilaterally. No accessory muscle use.  - CARDIOVASCULAR: Regular rate and rhythm. No murmur. No JVD.  - ABDOMEN: Soft, non-tender and non-distended. No palpable masses.  - EXTREMITIES: Trace ankle edema. Non-tender.  - SKIN: No rashes or lesions. Warm.  - NEUROLOGIC: No focal neurological deficits. CN II-XII grossly intact, but not individually tested.  - PSYCHIATRIC: Cooperative.

## 2025-01-29 NOTE — ED ADULT TRIAGE NOTE - WEIGHT IN KG
What Type Of Note Output Would You Prefer (Optional)?: Standard Output Hpi Title: Evaluation of Skin Lesions How Severe Are Your Spot(S)?: mild Have Your Spot(S) Been Treated In The Past?: has not been treated 81.6

## 2025-01-29 NOTE — H&P ADULT - PROBLEM SELECTOR PLAN 7
-Continue with home Dozepin 100 mg QHS for insomnia/anxiety/depression  -Zolpidem PRN (consider discontinuation in elderly patient as discharge) -Continue with home Doxepin 100 mg QHS for insomnia/anxiety/depression  -Zolpidem PRN (consider discontinuation in elderly patient at discharge)  -Melatonin PM

## 2025-01-29 NOTE — ED ADULT NURSE NOTE - OBJECTIVE STATEMENT
pt received to rm 6a  , a&ox4 , ambulatory , phx of HTN, HLD, DM2. , p/w low sodium and generalized weakness sent in by PCP . Pt breathing even and unlabored on room air. Denies fever, chills, cough, SOB, chest pain, palpitations, dizziness, nausea, vomiting, diarrhea, constipation, numbness, tingling. IV placed. Labs collected and sent. EKG in chart. pending  labr .  pt educated on fall precautions and confirms understanding via teach back method. Stretcher locked in lowest position with siderails up x2. Call bell and personal items within reach.

## 2025-01-29 NOTE — H&P ADULT - PROBLEM SELECTOR PLAN 1
::Chronic hyponatremia i/s/o poor oral intake/tea-toast diet   - Obtaining SOsm, Solomon, UOsm  - s/p 1L Bolus in ED. Na 126 ->129   - Will trend Na for improvement  - Check lipid panel and proteins for pseudohyponatremia  - Monitor I&Os  - monitor BMP  - Avoid overcorrection  - Nutrition consultation

## 2025-01-29 NOTE — ED ADULT NURSE NOTE - NSFALLUNIVINTERV_ED_ALL_ED
Bed/Stretcher in lowest position, wheels locked, appropriate side rails in place/Call bell, personal items and telephone in reach/Instruct patient to call for assistance before getting out of bed/chair/stretcher/Non-slip footwear applied when patient is off stretcher/[place] to call system/Physically safe environment - no spills, clutter or unnecessary equipment/Purposeful proactive rounding/Room/bathroom lighting operational, light cord in reach

## 2025-01-29 NOTE — ED PROVIDER NOTE - CLINICAL SUMMARY MEDICAL DECISION MAKING FREE TEXT BOX
Weakness and fatigue.  Patient diagnosed with hyponatremia PCP yesterday.  Will check electrolytes today, EKG.  Will give IV fluids as patient has not been eating or drinking and might be dehydrated.  Will reassess

## 2025-01-29 NOTE — ED ADULT NURSE REASSESSMENT NOTE - NS ED NURSE REASSESS COMMENT FT1
Report received from AFSHAN Philip. Pt A&Ox3 sitting up in stretcher resting comfortably. Respirations even and unlabored on room air. No acute distress noted. Denies headache, dizziness, chest pain and SOB at this time. IVF completed at this time, repeat blood work drawn and sent per MD orders. VS as noted in flow sheet. Plan of care ongoing, comfort measures provided and safety measures maintained. Awaiting lab results.

## 2025-01-29 NOTE — H&P ADULT - PROBLEM SELECTOR PLAN 6
-Home Regimen:   -A1c: %  -Hold home oral diabetic medications while inpatient   -Corrective SSI (Mild) TID before meals  -Goal -180  -FS Blood glucose monitoring Premeal/Bedtime   -Hypoglycemia precautions   -Carb Consistent Diet   -Nutrition consult -Home Regimen: Metformin 500 mg BID, Glipizide 5 mg QD  -A1c: ordered  -Hold home oral diabetic medications while inpatient   -Corrective SSI (Mild) TID before meals  -Goal -180  -FS Blood glucose monitoring Premeal/Bedtime   -Hypoglycemia precautions   -Carb Consistent Diet   -Nutrition consult

## 2025-01-29 NOTE — H&P ADULT - HISTORY OF PRESENT ILLNESS
74-year-old female with PMHx of HTN, DM2, glaucoma, and insomnia who is brought to the ED by daughter for hyponatremia. Patient was seen by PCP and found to have a Na of 124 and was sent to ED for further management. Patient endorses generalized weakness, and fatigue. Patient reports having an episode of lightheadedness in ED while going to the bathroom. Denies any chest pain, shortness of breath, recent fall, LOC, nausea, vomiting, diarrhea, or abdominal pain. Reports poor appetite. Denies any weight change, No history of malignancy. No night sweats, fever or chills. No other acute concerns at this time.

## 2025-01-29 NOTE — H&P ADULT - ASSESSMENT
74-year-old female with PMHx of HTN, DM2, glaucoma, and insomnia admitted for hyponatremia and generalized weakness and not being able to ambulate in ED. Admitted to medicine for further management and monitoring. Detailed plan as below:

## 2025-01-29 NOTE — ED ADULT NURSE REASSESSMENT NOTE - NS ED NURSE REASSESS COMMENT FT1
Report received from pauly RN. Pt resting on stretcher, NAD noted. Pending results. Frequent monitoring in place.

## 2025-01-30 ENCOUNTER — TRANSCRIPTION ENCOUNTER (OUTPATIENT)
Age: 75
End: 2025-01-30

## 2025-01-30 VITALS
HEART RATE: 90 BPM | DIASTOLIC BLOOD PRESSURE: 73 MMHG | SYSTOLIC BLOOD PRESSURE: 140 MMHG | RESPIRATION RATE: 18 BRPM | TEMPERATURE: 98 F | OXYGEN SATURATION: 100 %

## 2025-01-30 DIAGNOSIS — E78.5 HYPERLIPIDEMIA, UNSPECIFIED: ICD-10-CM

## 2025-01-30 DIAGNOSIS — R53.1 WEAKNESS: ICD-10-CM

## 2025-01-30 DIAGNOSIS — E11.9 TYPE 2 DIABETES MELLITUS WITHOUT COMPLICATIONS: ICD-10-CM

## 2025-01-30 DIAGNOSIS — H40.9 UNSPECIFIED GLAUCOMA: ICD-10-CM

## 2025-01-30 DIAGNOSIS — G47.00 INSOMNIA, UNSPECIFIED: ICD-10-CM

## 2025-01-30 DIAGNOSIS — D64.9 ANEMIA, UNSPECIFIED: ICD-10-CM

## 2025-01-30 DIAGNOSIS — Z29.9 ENCOUNTER FOR PROPHYLACTIC MEASURES, UNSPECIFIED: ICD-10-CM

## 2025-01-30 DIAGNOSIS — I10 ESSENTIAL (PRIMARY) HYPERTENSION: ICD-10-CM

## 2025-01-30 DIAGNOSIS — E87.1 HYPO-OSMOLALITY AND HYPONATREMIA: ICD-10-CM

## 2025-01-30 LAB
A1C WITH ESTIMATED AVERAGE GLUCOSE RESULT: 6.6 % — HIGH (ref 4–5.6)
ADD ON TEST-SPECIMEN IN LAB: SIGNIFICANT CHANGE UP
ALBUMIN SERPL ELPH-MCNC: 4 G/DL — SIGNIFICANT CHANGE UP (ref 3.3–5)
ALP SERPL-CCNC: 47 U/L — SIGNIFICANT CHANGE UP (ref 40–120)
ALT FLD-CCNC: 20 U/L — SIGNIFICANT CHANGE UP (ref 4–33)
ANION GAP SERPL CALC-SCNC: 11 MMOL/L — SIGNIFICANT CHANGE UP (ref 7–14)
AST SERPL-CCNC: 23 U/L — SIGNIFICANT CHANGE UP (ref 4–32)
BILIRUB DIRECT SERPL-MCNC: <0.2 MG/DL — SIGNIFICANT CHANGE UP (ref 0–0.3)
BILIRUB INDIRECT FLD-MCNC: SIGNIFICANT CHANGE UP MG/DL (ref 0–1)
BILIRUB SERPL-MCNC: <0.2 MG/DL — SIGNIFICANT CHANGE UP (ref 0.2–1.2)
BUN SERPL-MCNC: 6 MG/DL — LOW (ref 7–23)
CALCIUM SERPL-MCNC: 8.8 MG/DL — SIGNIFICANT CHANGE UP (ref 8.4–10.5)
CHLORIDE SERPL-SCNC: 96 MMOL/L — LOW (ref 98–107)
CHOLEST SERPL-MCNC: 147 MG/DL — SIGNIFICANT CHANGE UP
CO2 SERPL-SCNC: 22 MMOL/L — SIGNIFICANT CHANGE UP (ref 22–31)
CREAT SERPL-MCNC: 0.54 MG/DL — SIGNIFICANT CHANGE UP (ref 0.5–1.3)
EGFR: 97 ML/MIN/1.73M2 — SIGNIFICANT CHANGE UP
ESTIMATED AVERAGE GLUCOSE: 143 — SIGNIFICANT CHANGE UP
GLUCOSE BLDC GLUCOMTR-MCNC: 181 MG/DL — HIGH (ref 70–99)
GLUCOSE BLDC GLUCOMTR-MCNC: 187 MG/DL — HIGH (ref 70–99)
GLUCOSE SERPL-MCNC: 122 MG/DL — HIGH (ref 70–99)
HCT VFR BLD CALC: 30.4 % — LOW (ref 34.5–45)
HDLC SERPL-MCNC: 91 MG/DL — SIGNIFICANT CHANGE UP
HGB BLD-MCNC: 10.4 G/DL — LOW (ref 11.5–15.5)
LIPID PNL WITH DIRECT LDL SERPL: 44 MG/DL — SIGNIFICANT CHANGE UP
MAGNESIUM SERPL-MCNC: 1.5 MG/DL — LOW (ref 1.6–2.6)
MCHC RBC-ENTMCNC: 31.9 PG — SIGNIFICANT CHANGE UP (ref 27–34)
MCHC RBC-ENTMCNC: 34.2 G/DL — SIGNIFICANT CHANGE UP (ref 32–36)
MCV RBC AUTO: 93.3 FL — SIGNIFICANT CHANGE UP (ref 80–100)
NON HDL CHOLESTEROL: 56 MG/DL — SIGNIFICANT CHANGE UP
NRBC # BLD AUTO: 0 K/UL — SIGNIFICANT CHANGE UP (ref 0–0)
NRBC # BLD: 0 /100 WBCS — SIGNIFICANT CHANGE UP (ref 0–0)
NRBC # FLD: 0 K/UL — SIGNIFICANT CHANGE UP (ref 0–0)
NRBC BLD-RTO: 0 /100 WBCS — SIGNIFICANT CHANGE UP (ref 0–0)
OSMOLALITY SERPL: 283 MOSM/KG — SIGNIFICANT CHANGE UP (ref 275–295)
PHOSPHATE SERPL-MCNC: 3.2 MG/DL — SIGNIFICANT CHANGE UP (ref 2.5–4.5)
PLATELET # BLD AUTO: 275 K/UL — SIGNIFICANT CHANGE UP (ref 150–400)
POTASSIUM SERPL-MCNC: 4 MMOL/L — SIGNIFICANT CHANGE UP (ref 3.5–5.3)
POTASSIUM SERPL-SCNC: 4 MMOL/L — SIGNIFICANT CHANGE UP (ref 3.5–5.3)
PROT SERPL-MCNC: 6.9 G/DL — SIGNIFICANT CHANGE UP (ref 6–8.3)
RBC # BLD: 3.26 M/UL — LOW (ref 3.8–5.2)
RBC # FLD: 13.5 % — SIGNIFICANT CHANGE UP (ref 10.3–14.5)
SODIUM SERPL-SCNC: 129 MMOL/L — LOW (ref 135–145)
TRIGL SERPL-MCNC: 60 MG/DL — SIGNIFICANT CHANGE UP
WBC # BLD: 3.74 K/UL — LOW (ref 3.8–10.5)
WBC # FLD AUTO: 3.74 K/UL — LOW (ref 3.8–10.5)

## 2025-01-30 PROCEDURE — 99239 HOSP IP/OBS DSCHRG MGMT >30: CPT

## 2025-01-30 RX ORDER — DM/PSEUDOEPHED/ACETAMINOPHEN 10-30-250
25 CAPSULE ORAL ONCE
Refills: 0 | Status: DISCONTINUED | OUTPATIENT
Start: 2025-01-30 | End: 2025-01-30

## 2025-01-30 RX ORDER — ATORVASTATIN CALCIUM 80 MG/1
1 TABLET, FILM COATED ORAL
Refills: 0 | DISCHARGE

## 2025-01-30 RX ORDER — INSULIN LISPRO 100/ML
VIAL (ML) SUBCUTANEOUS
Refills: 0 | Status: DISCONTINUED | OUTPATIENT
Start: 2025-01-30 | End: 2025-01-30

## 2025-01-30 RX ORDER — BRIMONIDINE TARTRATE AND TIMOLOL MALEATE 2; 5 MG/ML; MG/ML
1 SOLUTION/ DROPS OPHTHALMIC
Refills: 0 | DISCHARGE

## 2025-01-30 RX ORDER — HYDRALAZINE HCL 100 MG
1 TABLET ORAL
Refills: 0 | DISCHARGE

## 2025-01-30 RX ORDER — DOXEPIN 3 MG/1
100 TABLET, FILM COATED ORAL AT BEDTIME
Refills: 0 | Status: DISCONTINUED | OUTPATIENT
Start: 2025-01-30 | End: 2025-01-30

## 2025-01-30 RX ORDER — BRIMONIDINE TARTRATE 1.5 MG/ML
1 SOLUTION OPHTHALMIC
Refills: 0 | Status: DISCONTINUED | OUTPATIENT
Start: 2025-01-30 | End: 2025-01-30

## 2025-01-30 RX ORDER — NIFEDIPINE 90 MG/1
1 TABLET, FILM COATED, EXTENDED RELEASE ORAL
Refills: 0 | DISCHARGE

## 2025-01-30 RX ORDER — ACETAMINOPHEN 160 MG/5ML
650 SUSPENSION ORAL EVERY 6 HOURS
Refills: 0 | Status: DISCONTINUED | OUTPATIENT
Start: 2025-01-30 | End: 2025-01-30

## 2025-01-30 RX ORDER — DM/PSEUDOEPHED/ACETAMINOPHEN 10-30-250
12.5 CAPSULE ORAL ONCE
Refills: 0 | Status: DISCONTINUED | OUTPATIENT
Start: 2025-01-30 | End: 2025-01-30

## 2025-01-30 RX ORDER — DM/PSEUDOEPHED/ACETAMINOPHEN 10-30-250
15 CAPSULE ORAL ONCE
Refills: 0 | Status: DISCONTINUED | OUTPATIENT
Start: 2025-01-30 | End: 2025-01-30

## 2025-01-30 RX ORDER — NIFEDIPINE 90 MG/1
90 TABLET, FILM COATED, EXTENDED RELEASE ORAL DAILY
Refills: 0 | Status: DISCONTINUED | OUTPATIENT
Start: 2025-01-30 | End: 2025-01-30

## 2025-01-30 RX ORDER — ACETAMINOPHEN, DIPHENHYDRAMINE HCL, PHENYLEPHRINE HCL 325; 25; 5 MG/1; MG/1; MG/1
6 TABLET ORAL ONCE
Refills: 0 | Status: COMPLETED | OUTPATIENT
Start: 2025-01-30 | End: 2025-01-30

## 2025-01-30 RX ORDER — KETOROLAC TROMETHAMINE 0.5 %
1 DROPS OPHTHALMIC (EYE) DAILY
Refills: 0 | Status: DISCONTINUED | OUTPATIENT
Start: 2025-01-30 | End: 2025-01-30

## 2025-01-30 RX ORDER — ATORVASTATIN CALCIUM 80 MG/1
10 TABLET, FILM COATED ORAL AT BEDTIME
Refills: 0 | Status: DISCONTINUED | OUTPATIENT
Start: 2025-01-30 | End: 2025-01-30

## 2025-01-30 RX ORDER — BROMFENAC 0.9 MG/ML
1 SOLUTION/ DROPS OPHTHALMIC
Refills: 0 | DISCHARGE

## 2025-01-30 RX ORDER — TIMOLOL MALEATE 5 MG/ML
1 SOLUTION OPHTHALMIC
Refills: 0 | Status: DISCONTINUED | OUTPATIENT
Start: 2025-01-30 | End: 2025-01-30

## 2025-01-30 RX ORDER — ZOLPIDEM TARTRATE 5 MG/1
1 TABLET, COATED ORAL
Refills: 0 | DISCHARGE

## 2025-01-30 RX ORDER — GLUCAGON 3 MG/1
1 POWDER NASAL ONCE
Refills: 0 | Status: DISCONTINUED | OUTPATIENT
Start: 2025-01-30 | End: 2025-01-30

## 2025-01-30 RX ORDER — MAGNESIUM SULFATE 0.8 MEQ/ML
1 AMPUL (ML) INJECTION ONCE
Refills: 0 | Status: COMPLETED | OUTPATIENT
Start: 2025-01-30 | End: 2025-01-30

## 2025-01-30 RX ORDER — INSULIN LISPRO 100/ML
VIAL (ML) SUBCUTANEOUS AT BEDTIME
Refills: 0 | Status: DISCONTINUED | OUTPATIENT
Start: 2025-01-30 | End: 2025-01-30

## 2025-01-30 RX ORDER — SODIUM CHLORIDE 9 G/ML
1000 INJECTION, SOLUTION INTRAVENOUS
Refills: 0 | Status: DISCONTINUED | OUTPATIENT
Start: 2025-01-30 | End: 2025-01-30

## 2025-01-30 RX ORDER — ENOXAPARIN SODIUM 100 MG/ML
40 INJECTION SUBCUTANEOUS EVERY 24 HOURS
Refills: 0 | Status: DISCONTINUED | OUTPATIENT
Start: 2025-01-30 | End: 2025-01-30

## 2025-01-30 RX ORDER — HYDRALAZINE HCL 100 MG
100 TABLET ORAL THREE TIMES A DAY
Refills: 0 | Status: DISCONTINUED | OUTPATIENT
Start: 2025-01-30 | End: 2025-01-30

## 2025-01-30 RX ORDER — DOXEPIN 3 MG/1
1 TABLET, FILM COATED ORAL
Refills: 0 | DISCHARGE

## 2025-01-30 RX ADMIN — Medication 1: at 09:16

## 2025-01-30 RX ADMIN — ENOXAPARIN SODIUM 40 MILLIGRAM(S): 100 INJECTION SUBCUTANEOUS at 05:06

## 2025-01-30 RX ADMIN — Medication 100 MILLIGRAM(S): at 14:18

## 2025-01-30 RX ADMIN — Medication 100 GRAM(S): at 14:17

## 2025-01-30 RX ADMIN — Medication 1 DROP(S): at 14:20

## 2025-01-30 RX ADMIN — NIFEDIPINE 90 MILLIGRAM(S): 90 TABLET, FILM COATED, EXTENDED RELEASE ORAL at 05:06

## 2025-01-30 RX ADMIN — Medication 40 MILLIGRAM(S): at 05:06

## 2025-01-30 RX ADMIN — Medication 1: at 14:13

## 2025-01-30 RX ADMIN — Medication 100 MILLIGRAM(S): at 01:20

## 2025-01-30 RX ADMIN — ACETAMINOPHEN, DIPHENHYDRAMINE HCL, PHENYLEPHRINE HCL 6 MILLIGRAM(S): 325; 25; 5 TABLET ORAL at 03:30

## 2025-01-30 NOTE — DISCHARGE NOTE PROVIDER - NSDCCPTREATMENT_GEN_ALL_CORE_FT
PRINCIPAL PROCEDURE  Procedure: CT head  Findings and Treatment: FINDINGS:  VENTRICLES AND SULCI: Age appropriate involutional changes.  INTRA-AXIAL: No mass effect, acute hemorrhage, or midline shift.  There   are periventricular and subcortical white matter hypodensities,   consistent with microvascular type changes.  EXTRA-AXIAL: No mass or fluid collection. Basal cisterns are normal in   appearance.  VISUALIZED SINUSES:  Clear.  TYMPANOMASTOID CAVITIES:  Clear.  VISUALIZED ORBITS: Bilateral lens replacement.  CALVARIUM: Intact.  MISCELLANEOUS: None.  IMPRESSION:  No acute intracranial hemorrhage, mass effect, or shift of the midline   structures.

## 2025-01-30 NOTE — DISCHARGE NOTE NURSING/CASE MANAGEMENT/SOCIAL WORK - FINANCIAL ASSISTANCE
[facility] provides services at a reduced cost to those who are determined to be eligible through [facility]’s financial assistance program. Information regarding [facility]’s financial assistance program can be found by going to [email or web address] or by calling [phone].

## 2025-01-30 NOTE — PROGRESS NOTE ADULT - PROBLEM SELECTOR PLAN 2
Hgb in 10 range, as well as mild leukopenia  - patient reports poor appetite/early satiety for the past few months  - patient reports she is UTD with mammogram, pap smear, colonoscopy  - recommend outpatient hematology evaluation and GI for possible EGD Hgb in 10 range, as well as mild leukopenia  - patient reports poor appetite/early satiety for the past few months  - patient reports she is UTD with mammogram, pap smear, colonoscopy  - recommend outpatient hematology evaluation to r/o MM and GI for possible EGD

## 2025-01-30 NOTE — PROGRESS NOTE ADULT - PROBLEM SELECTOR PLAN 8
-Continue with home Doxepin 100 mg QHS for insomnia/anxiety/depression  -Zolpidem PRN (consider discontinuation in elderly patient at discharge)  -Melatonin PM

## 2025-01-30 NOTE — DISCHARGE NOTE PROVIDER - PROVIDER TOKENS
FREE:[LAST:[Primary Care Provider],PHONE:[(   )    -],FAX:[(   )    -],FOLLOWUP:[1 week],ESTABLISHEDPATIENT:[T]] PROVIDER:[TOKEN:[[id number]],FOLLOWUP:[1 week]]

## 2025-01-30 NOTE — PROGRESS NOTE ADULT - SUBJECTIVE AND OBJECTIVE BOX
[facility] Medicine  Available on Microsoft Teams    Patient is a 74y old  Female who presents with a chief complaint of Hyponatremia, generalized weakness (29 Jan 2025 23:53)      SUBJECTIVE / OVERNIGHT EVENTS: Patient seen and examined at bedside. Family at bedside. Patient requesting if she can go home today- feels better and does not want to keep waiting in the ER for a bed. Patient walked in the hallway using her cane and stated she felt stronger.    ADDITIONAL REVIEW OF SYSTEMS:    MEDICATIONS  (STANDING):  atorvastatin 10 milliGRAM(s) Oral at bedtime  brimonidine 0.2% Ophthalmic Solution 1 Drop(s) Both EYES two times a day  dextrose 5%. 1000 milliLiter(s) (100 mL/Hr) IV Continuous <Continuous>  dextrose 5%. 1000 milliLiter(s) (50 mL/Hr) IV Continuous <Continuous>  dextrose 50% Injectable 25 Gram(s) IV Push once  dextrose 50% Injectable 12.5 Gram(s) IV Push once  dextrose 50% Injectable 25 Gram(s) IV Push once  doxepin 100 milliGRAM(s) Oral at bedtime  enoxaparin Injectable 40 milliGRAM(s) SubCutaneous every 24 hours  glucagon  Injectable 1 milliGRAM(s) IntraMuscular once  hydrALAZINE 100 milliGRAM(s) Oral three times a day  insulin lispro (ADMELOG) corrective regimen sliding scale   SubCutaneous three times a day before meals  insulin lispro (ADMELOG) corrective regimen sliding scale   SubCutaneous at bedtime  ketorolac 0.5% Ophthalmic Solution 1 Drop(s) Both EYES daily  lisinopril 40 milliGRAM(s) Oral daily  magnesium sulfate  IVPB 1 Gram(s) IV Intermittent once  NIFEdipine XL 90 milliGRAM(s) Oral daily  timolol 0.5% Solution 1 Drop(s) Both EYES two times a day    MEDICATIONS  (PRN):  acetaminophen     Tablet .. 650 milliGRAM(s) Oral every 6 hours PRN Temp greater or equal to 38C (100.4F), Mild Pain (1 - 3)  dextrose Oral Gel 15 Gram(s) Oral once PRN Blood Glucose LESS THAN 70 milliGRAM(s)/deciliter      CAPILLARY BLOOD GLUCOSE      POCT Blood Glucose.: 181 mg/dL (30 Jan 2025 08:48)  POCT Blood Glucose.: 139 mg/dL (29 Jan 2025 23:30)    I&O's Summary      PHYSICAL EXAM:    Vital Signs Last 24 Hrs  T(C): 36.9 (30 Jan 2025 05:00), Max: 37.3 (29 Jan 2025 21:02)  T(F): 98.5 (30 Jan 2025 05:00), Max: 99.1 (29 Jan 2025 21:02)  HR: 74 (30 Jan 2025 05:00) (74 - 82)  BP: 165/80 (30 Jan 2025 05:00) (155/76 - 173/83)  BP(mean): 107 (30 Jan 2025 00:07) (107 - 107)  RR: 18 (30 Jan 2025 05:00) (18 - 20)  SpO2: 100% (30 Jan 2025 05:00) (100% - 100%)    Parameters below as of 30 Jan 2025 05:00  Patient On (Oxygen Delivery Method): room air      CONSTITUTIONAL: NAD  EYES: Conjunctiva and sclera clear  ENMT: Moist oral mucosa  RESPIRATORY: Normal respiratory effort; lungs are clear to auscultation bilaterally  CARDIOVASCULAR: Regular rate and rhythm, normal S1 and S2, no murmur/rub/gallop; No lower extremity edema  ABDOMEN: Soft, nontender to palpation, normoactive bowel sounds  PSYCH: A+O to person, place, and time  NEUROLOGY: No focal deficits  SKIN: No rashes; no palpable lesions    LABS:                        10.4   3.74  )-----------( 275      ( 30 Jan 2025 05:32 )             30.4     01-30    129[L]  |  96[L]  |  6[L]  ----------------------------<  122[H]  4.0   |  22  |  0.54    Ca    8.8      30 Jan 2025 05:32  Phos  3.2     01-30  Mg     1.50     01-30    TPro  7.1  /  Alb  4.1  /  TBili  <0.2  /  DBili  x   /  AST  28  /  ALT  20  /  AlkPhos  47  01-29      Urinalysis Basic - ( 30 Jan 2025 05:32 )    Color: x / Appearance: x / SG: x / pH: x  Gluc: 122 mg/dL / Ketone: x  / Bili: x / Urobili: x   Blood: x / Protein: x / Nitrite: x   Leuk Esterase: x / RBC: x / WBC x   Sq Epi: x / Non Sq Epi: x / Bacteria: x      RADIOLOGY & ADDITIONAL TESTS: No new imaging  Results Reviewed: Yes  Imaging Personally Reviewed:  Electrocardiogram Personally Reviewed:    COORDINATION OF CARE:  Care Discussed with Consultants/Other Providers [Y/N]:  Prior or Outpatient Records Reviewed [Y/N]:

## 2025-01-30 NOTE — DISCHARGE NOTE PROVIDER - NSDCCPCAREPLAN_GEN_ALL_CORE_FT
PRINCIPAL DISCHARGE DIAGNOSIS  Diagnosis: Hyponatremia  Assessment and Plan of Treatment: Your salt levels were low and improved after IV fluids.  You  need to see your doctor within 1 week to have repeat blood work and have your sodium levels rechecked      SECONDARY DISCHARGE DIAGNOSES  Diagnosis: HTN (hypertension)  Assessment and Plan of Treatment: Continue current blood pressure medication regimen as directed. Monitor for any visual changes, headaches or dizziness.  Monitor blood pressure regularly.  Follow up with your PCP for further management for high blood pressure, please call to make appointment within 1 week of discharge    Diagnosis: Anemia  Assessment and Plan of Treatment: It is recommended that you see a hematologist (blood doctor) for further anemia workup.    Diagnosis: HLD (hyperlipidemia)  Assessment and Plan of Treatment: Continue prescribed medications to control your cholesterol levels and a DASH (Low fat/salt) diet. Follow up with your primary care provider upon discharge for further management and monitoring of cholesterol levels.      Diagnosis: Diabetes mellitus  Assessment and Plan of Treatment: Continue consistent carbohydrate diet.  Monitor blood glucose levels throughout the day before meals and at bedtime.  Record blood sugars and bring to outpatient providers appointment in order to be reviewed by your doctor for management modifications.  Be aware of diabetes management symptoms including feeling cool and clammy may be related to low glucose levels.  Feeling hot and dry may indicate high glucose levels.  If  you feel these symptoms, check your blood sugar.  Make regular podiatry appointments in order to have feet checked for wounds and toe nails cut by a doctor to prevent infections.      Diagnosis: Weakness  Assessment and Plan of Treatment:      PRINCIPAL DISCHARGE DIAGNOSIS  Diagnosis: Hyponatremia  Assessment and Plan of Treatment: Your salt levels were low and improved after IV fluids. You  need to see your doctor within 1 week to have repeat blood work and have your sodium levels rechecked      SECONDARY DISCHARGE DIAGNOSES  Diagnosis: Weakness  Assessment and Plan of Treatment:     Diagnosis: Anemia  Assessment and Plan of Treatment: It is recommended that you see a hematologist (blood doctor) for further anemia workup. Your hemoglobin level is 10.4, which is below normal.    Diagnosis: HTN (hypertension)  Assessment and Plan of Treatment: Continue current blood pressure medication regimen as directed. Monitor for any visual changes, headaches or dizziness.  Monitor blood pressure regularly.  Follow up with your PCP for further management for high blood pressure, please call to make appointment within 1 week of discharge    Diagnosis: HLD (hyperlipidemia)  Assessment and Plan of Treatment: Continue prescribed medications to control your cholesterol levels and a DASH (Low fat/salt) diet. Follow up with your primary care provider upon discharge for further management and monitoring of cholesterol levels.      Diagnosis: Diabetes mellitus  Assessment and Plan of Treatment: Continue consistent carbohydrate diet.  Monitor blood glucose levels throughout the day before meals and at bedtime.  Record blood sugars and bring to outpatient providers appointment in order to be reviewed by your doctor for management modifications.  Be aware of diabetes management symptoms including feeling cool and clammy may be related to low glucose levels.  Feeling hot and dry may indicate high glucose levels.  If  you feel these symptoms, check your blood sugar.  Make regular podiatry appointments in order to have feet checked for wounds and toe nails cut by a doctor to prevent infections.

## 2025-01-30 NOTE — DISCHARGE NOTE NURSING/CASE MANAGEMENT/SOCIAL WORK - NSDCPEFALRISK_GEN_ALL_CORE
For information on Fall & Injury Prevention, visit: [email or web address] OR  [email or web address] OR  https://www.cdc.gov/steadi/patient.html

## 2025-01-30 NOTE — PROVIDER CONTACT NOTE (OTHER) - ASSESSMENT
Patient is A&Ox4. No signs of acute distress. Denies chest pain, shortness of breath, headache or any visual changes. Patient is asymptomatic

## 2025-01-30 NOTE — DISCHARGE NOTE PROVIDER - HOSPITAL COURSE
74-year-old female with past medical history of HTN, DM2, glaucoma, and insomnia admitted for hyponatremia and generalized weakness and not being able to ambulate in ED. Admitted to medicine for further management and monitoring. Labs notable for hyponatremia to 126, of note patient w/chronic hyponatremia and recent outpatient labs w/sodium of 124. Weakness self resolved after IV fluids and sodium improved to 129.   Patient ambulated w/MD in ER, recommendations for outpatient physical therapy were made.  Plan to discharge patient home with outpatient follow up with her primary care provider next week for a repeat BMP.  Patient recommended to see hematology as an outpatient for anemia workup.     74-year-old female with past medical history of HTN, DM2, glaucoma, and insomnia admitted for hyponatremia and generalized weakness and not being able to ambulate in ED. Admitted to medicine for further management and monitoring. Labs notable for hyponatremia to 126, of note patient w/chronic hyponatremia and recent outpatient labs w/sodium of 124. Weakness self resolved after IV fluids and sodium improved to 129. Serum osmolality also returned normal and therefore patient was not started on salt tabs. Patient ambulated w/MD in ER, recommendations for outpatient physical therapy were made.  Plan to discharge patient home with outpatient follow up with her primary care provider next week for a repeat BMP.  Patient recommended to see hematology as an outpatient for anemia workup.

## 2025-01-30 NOTE — DISCHARGE NOTE PROVIDER - NSDCMRMEDTOKEN_GEN_ALL_CORE_FT
atorvastatin 10 mg oral tablet: 1 tab(s) orally once a day  Combigan 0.2%-0.5% ophthalmic solution: 1 drop(s) in each affected eye 2 times a day  doxepin 75 mg oral capsule: 1 cap(s) orally once a day  glipiZIDE 5 mg oral tablet: 1 tab(s) orally once a day  hydrALAZINE 100 mg oral tablet: 1 tab(s) orally 3 times a day  lisinopril 40 mg oral tablet: 1 tab(s) orally once a day  metFORMIN 500 mg oral tablet, extended release: 1 tab(s) orally 2 times a day   NIFEdipine 90 mg oral tablet, extended release: 1 tab(s) orally once a day  Prolensa 0.07% ophthalmic solution: 1 drop(s) in each affected eye once a day  zolpidem 5 mg oral tablet: 1 tab(s) orally once a day (at bedtime)   atorvastatin 10 mg oral tablet: 1 tab(s) orally once a day  Combigan 0.2%-0.5% ophthalmic solution: 1 drop(s) in each affected eye 2 times a day  doxepin 75 mg oral capsule: 1 cap(s) orally once a day  glipiZIDE 5 mg oral tablet: 1 tab(s) orally once a day  hydrALAZINE 100 mg oral tablet: 1 tab(s) orally 3 times a day  lisinopril 40 mg oral tablet: 1 tab(s) orally once a day  metFORMIN 500 mg oral tablet, extended release: 1 tab(s) orally 2 times a day   NIFEdipine 90 mg oral tablet, extended release: 1 tab(s) orally once a day  physical therapy: three times weekly  Prolensa 0.07% ophthalmic solution: 1 drop(s) in each affected eye once a day  zolpidem 5 mg oral tablet: 1 tab(s) orally once a day (at bedtime)

## 2025-01-30 NOTE — PROGRESS NOTE ADULT - PROBLEM SELECTOR PLAN 9
VTE PPx: Lovenox  Nutrition: CC Diet  Electrolytes: Maintain K>4, Mag >2, Phos > 3  Access: PIV  Code Status: FULL  Dispo: D/c home today

## 2025-01-30 NOTE — PROVIDER CONTACT NOTE (OTHER) - SITUATION
Lifestyle recommendations:  Being overweight or obese puts you are risk of major health problems including but not limited to: heart disease/heart attack, stroke, high cholesterol, high blood pressure, and diabetes.  This is why it is important to be at a healthy weight for your height.     Exercise 30 minutes 3-5 times a week, if you can only do 10 minutes 3 times a week that is still shown to have great benefit!  Brisk walking even counts for this.  Consider free youtube videos for exercise that fits your needs and lifestyle.     Monitor your caffeine and soda intake, try to minimize these beverages    Drink plenty of water (about 70-80 ounces a day)    Try to eat a vegetable and fruit  with lunch and dinner.  Have a breakfast that contains protein such as eggs or oatmeal.  Decrease your white bread, pasta, and sweets intake.  Increase lean proteins like chicken or pork. Try to eat out 1-2 times a week or less.  Monitor your portion sizes, try using smaller plates if needed.  Eat slowly, this gives you time to be aware that your body is full.   Let me know at any time if you would like a referral to a nutritionist!      Preventive Health Recommendations  Male Ages 50 - 64    Yearly exam:             See your health care provider every year in order to  o   Review health changes.   o   Discuss preventive care.    o   Review your medicines if your doctor has prescribed any.   Have a cholesterol test every 5 years, or more frequently if you are at risk for high cholesterol/heart disease.   Have a diabetes test (fasting glucose) every three years. If you are at risk for diabetes, you should have this test more often.   Have a colonoscopy at age 50, or have a yearly FIT test (stool test). These exams will check for colon cancer.    Talk with your health care provider about whether or not a prostate cancer screening test (PSA) is right for you.  You should be tested each year for STDs (sexually transmitted diseases), if  you re at risk.     Shots: Get a flu shot each year. Get a tetanus shot every 10 years.     Nutrition:  Eat at least 5 servings of fruits and vegetables daily.   Eat whole-grain bread, whole-wheat pasta and brown rice instead of white grains and rice.   Get adequate Calcium and Vitamin D.     Lifestyle  Exercise for at least 150 minutes a week (30 minutes a day, 5 days a week). This will help you control your weight and prevent disease.   Limit alcohol to one drink per day.   No smoking.   Wear sunscreen to prevent skin cancer.   See your dentist every six months for an exam and cleaning.   See your eye doctor every 1 to 2 years.     Patient blood pressure 165/80

## 2025-01-30 NOTE — DISCHARGE NOTE NURSING/CASE MANAGEMENT/SOCIAL WORK - PATIENT PORTAL LINK FT
You can access the FollowMyHealth Patient Portal offered by [facility] by registering at the following website: [email or web address]. By joining [employer]’s FollowMyHealth portal, you will also be able to view your health information using other applications (apps) compatible with our system.

## 2025-01-30 NOTE — DISCHARGE NOTE PROVIDER - CARE PROVIDER_API CALL
Primary Care Provider,   Phone: (   )    -  Fax: (   )    -  Established Patient  Follow Up Time: 1 week   FLORY ARMSTRONG  [address] [place] [address]  Phone: [phone]  Fax: [phone]  Follow Up Time: 1 week

## 2025-01-30 NOTE — PROGRESS NOTE ADULT - PROBLEM SELECTOR PLAN 1
Chronic hyponatremia i/s/o poor oral intake/tea-toast diet. Patient reports poor PO intake over the past few months. Reports she is up to date with age-appropriate cancer screening.  - serum osmolality normal, however Na is still low at 129  - presence of extra solutes? blood glucose is well controlled, Tbili normal, lipids normal  - check total protein level  - s/p 1L Bolus in ED. Na 126 ->129   - Will trend Na for improvement  - Monitor I&Os  - monitor BMP Chronic hyponatremia i/s/o poor oral intake/tea-toast diet. Patient reports poor PO intake over the past few months. Reports she is up to date with age-appropriate cancer screening.  - serum osmolality normal, however Na is still low at 129  - possible pseudohyponatremia? blood glucose is well controlled, Tbili normal, lipids normal  - check total protein level  - s/p 1L Bolus in ED. Na 126 ->129   - Will trend Na for improvement  - Monitor I&Os  - monitor BMP

## 2025-01-30 NOTE — PROGRESS NOTE ADULT - PROBLEM SELECTOR PLAN 7
-Home Regimen: Metformin 500 mg BID, Glipizide 5 mg QD  -A1c 6.6%  -Hold home oral diabetic medications while inpatient   -Corrective SSI (Mild) TID before meals  -Goal -180  -FS Blood glucose monitoring Premeal/Bedtime   -Hypoglycemia precautions   -Carb Consistent Diet   -Nutrition consult

## 2025-01-30 NOTE — PROGRESS NOTE ADULT - ASSESSMENT
74-year-old female with PMHx of HTN, DM2, glaucoma, and insomnia admitted for hyponatremia and generalized weakness and not being able to ambulate in ED.
